# Patient Record
Sex: FEMALE | Race: WHITE | NOT HISPANIC OR LATINO | URBAN - METROPOLITAN AREA
[De-identification: names, ages, dates, MRNs, and addresses within clinical notes are randomized per-mention and may not be internally consistent; named-entity substitution may affect disease eponyms.]

---

## 2017-03-08 ENCOUNTER — HOSPITAL ENCOUNTER (INPATIENT)
Facility: MEDICAL CENTER | Age: 77
LOS: 9 days | DRG: 189 | End: 2017-03-17
Attending: EMERGENCY MEDICINE | Admitting: INTERNAL MEDICINE
Payer: MEDICARE

## 2017-03-08 ENCOUNTER — RESOLUTE PROFESSIONAL BILLING HOSPITAL PROF FEE (OUTPATIENT)
Dept: HOSPITALIST | Facility: MEDICAL CENTER | Age: 77
End: 2017-03-08
Payer: MEDICARE

## 2017-03-08 ENCOUNTER — APPOINTMENT (OUTPATIENT)
Dept: RADIOLOGY | Facility: MEDICAL CENTER | Age: 77
DRG: 189 | End: 2017-03-08
Attending: EMERGENCY MEDICINE
Payer: MEDICARE

## 2017-03-08 DIAGNOSIS — R09.02 HYPOXIA: ICD-10-CM

## 2017-03-08 DIAGNOSIS — J44.1 COPD EXACERBATION (HCC): ICD-10-CM

## 2017-03-08 DIAGNOSIS — J44.1 CHRONIC OBSTRUCTIVE PULMONARY DISEASE WITH ACUTE EXACERBATION (HCC): ICD-10-CM

## 2017-03-08 DIAGNOSIS — I50.9 CONGESTIVE HEART FAILURE, UNSPECIFIED CONGESTIVE HEART FAILURE CHRONICITY, UNSPECIFIED CONGESTIVE HEART FAILURE TYPE: ICD-10-CM

## 2017-03-08 LAB
ANION GAP SERPL CALC-SCNC: 5 MMOL/L (ref 0–11.9)
BASOPHILS # BLD AUTO: 0.5 % (ref 0–1.8)
BASOPHILS # BLD: 0.05 K/UL (ref 0–0.12)
BNP SERPL-MCNC: 227 PG/ML (ref 0–100)
BUN SERPL-MCNC: 23 MG/DL (ref 8–22)
CALCIUM SERPL-MCNC: 8.4 MG/DL (ref 8.5–10.5)
CHLORIDE SERPL-SCNC: 102 MMOL/L (ref 96–112)
CO2 SERPL-SCNC: 28 MMOL/L (ref 20–33)
CREAT SERPL-MCNC: 1.07 MG/DL (ref 0.5–1.4)
EOSINOPHIL # BLD AUTO: 0.09 K/UL (ref 0–0.51)
EOSINOPHIL NFR BLD: 1 % (ref 0–6.9)
ERYTHROCYTE [DISTWIDTH] IN BLOOD BY AUTOMATED COUNT: 55.8 FL (ref 35.9–50)
GFR SERPL CREATININE-BSD FRML MDRD: 50 ML/MIN/1.73 M 2
GLUCOSE SERPL-MCNC: 122 MG/DL (ref 65–99)
HCT VFR BLD AUTO: 50.3 % (ref 37–47)
HGB BLD-MCNC: 16.5 G/DL (ref 12–16)
IMM GRANULOCYTES # BLD AUTO: 0.04 K/UL (ref 0–0.11)
IMM GRANULOCYTES NFR BLD AUTO: 0.4 % (ref 0–0.9)
LYMPHOCYTES # BLD AUTO: 1.69 K/UL (ref 1–4.8)
LYMPHOCYTES NFR BLD: 18.1 % (ref 22–41)
MCH RBC QN AUTO: 31 PG (ref 27–33)
MCHC RBC AUTO-ENTMCNC: 32.8 G/DL (ref 33.6–35)
MCV RBC AUTO: 94.5 FL (ref 81.4–97.8)
MONOCYTES # BLD AUTO: 0.83 K/UL (ref 0–0.85)
MONOCYTES NFR BLD AUTO: 8.9 % (ref 0–13.4)
NEUTROPHILS # BLD AUTO: 6.63 K/UL (ref 2–7.15)
NEUTROPHILS NFR BLD: 71.1 % (ref 44–72)
NRBC # BLD AUTO: 0 K/UL
NRBC BLD AUTO-RTO: 0 /100 WBC
PLATELET # BLD AUTO: 276 K/UL (ref 164–446)
PMV BLD AUTO: 9.5 FL (ref 9–12.9)
POTASSIUM SERPL-SCNC: 4.6 MMOL/L (ref 3.6–5.5)
RBC # BLD AUTO: 5.32 M/UL (ref 4.2–5.4)
SODIUM SERPL-SCNC: 135 MMOL/L (ref 135–145)
TROPONIN I SERPL-MCNC: 0.05 NG/ML (ref 0–0.04)
WBC # BLD AUTO: 9.3 K/UL (ref 4.8–10.8)

## 2017-03-08 PROCEDURE — 700111 HCHG RX REV CODE 636 W/ 250 OVERRIDE (IP): Performed by: INTERNAL MEDICINE

## 2017-03-08 PROCEDURE — 36415 COLL VENOUS BLD VENIPUNCTURE: CPT

## 2017-03-08 PROCEDURE — 93005 ELECTROCARDIOGRAM TRACING: CPT | Performed by: EMERGENCY MEDICINE

## 2017-03-08 PROCEDURE — A9270 NON-COVERED ITEM OR SERVICE: HCPCS | Performed by: EMERGENCY MEDICINE

## 2017-03-08 PROCEDURE — 83880 ASSAY OF NATRIURETIC PEPTIDE: CPT

## 2017-03-08 PROCEDURE — 87040 BLOOD CULTURE FOR BACTERIA: CPT | Mod: 91

## 2017-03-08 PROCEDURE — 94640 AIRWAY INHALATION TREATMENT: CPT

## 2017-03-08 PROCEDURE — 304562 HCHG STAT O2 MASK/CANNULA

## 2017-03-08 PROCEDURE — 84484 ASSAY OF TROPONIN QUANT: CPT

## 2017-03-08 PROCEDURE — 99285 EMERGENCY DEPT VISIT HI MDM: CPT

## 2017-03-08 PROCEDURE — A9270 NON-COVERED ITEM OR SERVICE: HCPCS | Performed by: INTERNAL MEDICINE

## 2017-03-08 PROCEDURE — 80048 BASIC METABOLIC PNL TOTAL CA: CPT

## 2017-03-08 PROCEDURE — 700105 HCHG RX REV CODE 258: Performed by: INTERNAL MEDICINE

## 2017-03-08 PROCEDURE — 700101 HCHG RX REV CODE 250: Performed by: EMERGENCY MEDICINE

## 2017-03-08 PROCEDURE — 700111 HCHG RX REV CODE 636 W/ 250 OVERRIDE (IP): Performed by: EMERGENCY MEDICINE

## 2017-03-08 PROCEDURE — 99223 1ST HOSP IP/OBS HIGH 75: CPT | Performed by: INTERNAL MEDICINE

## 2017-03-08 PROCEDURE — 700102 HCHG RX REV CODE 250 W/ 637 OVERRIDE(OP): Performed by: EMERGENCY MEDICINE

## 2017-03-08 PROCEDURE — 71010 DX-CHEST-PORTABLE (1 VIEW): CPT

## 2017-03-08 PROCEDURE — 96365 THER/PROPH/DIAG IV INF INIT: CPT

## 2017-03-08 PROCEDURE — 770020 HCHG ROOM/CARE - TELE (206)

## 2017-03-08 PROCEDURE — 85025 COMPLETE CBC W/AUTO DIFF WBC: CPT

## 2017-03-08 PROCEDURE — 96367 TX/PROPH/DG ADDL SEQ IV INF: CPT

## 2017-03-08 PROCEDURE — 700102 HCHG RX REV CODE 250 W/ 637 OVERRIDE(OP): Performed by: INTERNAL MEDICINE

## 2017-03-08 RX ORDER — IRBESARTAN 150 MG/1
300 TABLET ORAL DAILY
Status: DISCONTINUED | OUTPATIENT
Start: 2017-03-09 | End: 2017-03-10

## 2017-03-08 RX ORDER — HYDROCHLOROTHIAZIDE 25 MG/1
25 TABLET ORAL DAILY
COMMUNITY

## 2017-03-08 RX ORDER — OMEPRAZOLE 20 MG/1
20 CAPSULE, DELAYED RELEASE ORAL 2 TIMES DAILY
Status: DISCONTINUED | OUTPATIENT
Start: 2017-03-08 | End: 2017-03-17 | Stop reason: HOSPADM

## 2017-03-08 RX ORDER — ALPRAZOLAM 0.5 MG/1
1 TABLET ORAL 3 TIMES DAILY PRN
Status: DISCONTINUED | OUTPATIENT
Start: 2017-03-08 | End: 2017-03-17 | Stop reason: HOSPADM

## 2017-03-08 RX ORDER — ONDANSETRON 2 MG/ML
4 INJECTION INTRAMUSCULAR; INTRAVENOUS EVERY 4 HOURS PRN
Status: DISCONTINUED | OUTPATIENT
Start: 2017-03-08 | End: 2017-03-17 | Stop reason: HOSPADM

## 2017-03-08 RX ORDER — DILTIAZEM HYDROCHLORIDE 180 MG/1
180 CAPSULE, COATED, EXTENDED RELEASE ORAL 2 TIMES DAILY
Status: DISCONTINUED | OUTPATIENT
Start: 2017-03-08 | End: 2017-03-17 | Stop reason: HOSPADM

## 2017-03-08 RX ORDER — TEMAZEPAM 15 MG/1
15 CAPSULE ORAL
Status: DISCONTINUED | OUTPATIENT
Start: 2017-03-08 | End: 2017-03-17 | Stop reason: HOSPADM

## 2017-03-08 RX ORDER — METHYLPREDNISOLONE SODIUM SUCCINATE 40 MG/ML
40 INJECTION, POWDER, LYOPHILIZED, FOR SOLUTION INTRAMUSCULAR; INTRAVENOUS EVERY 6 HOURS
Status: DISCONTINUED | OUTPATIENT
Start: 2017-03-09 | End: 2017-03-09

## 2017-03-08 RX ORDER — SERTRALINE HYDROCHLORIDE 100 MG/1
100 TABLET, FILM COATED ORAL
Status: DISCONTINUED | OUTPATIENT
Start: 2017-03-08 | End: 2017-03-17 | Stop reason: HOSPADM

## 2017-03-08 RX ORDER — LORAZEPAM 1 MG/1
0.5 TABLET ORAL EVERY 6 HOURS PRN
Status: DISCONTINUED | OUTPATIENT
Start: 2017-03-08 | End: 2017-03-17 | Stop reason: HOSPADM

## 2017-03-08 RX ORDER — POLYETHYLENE GLYCOL 3350 17 G/17G
1 POWDER, FOR SOLUTION ORAL
Status: DISCONTINUED | OUTPATIENT
Start: 2017-03-08 | End: 2017-03-17 | Stop reason: HOSPADM

## 2017-03-08 RX ORDER — BISACODYL 10 MG
10 SUPPOSITORY, RECTAL RECTAL
Status: DISCONTINUED | OUTPATIENT
Start: 2017-03-08 | End: 2017-03-17 | Stop reason: HOSPADM

## 2017-03-08 RX ORDER — ACETAMINOPHEN 325 MG/1
650 TABLET ORAL EVERY 6 HOURS PRN
Status: DISCONTINUED | OUTPATIENT
Start: 2017-03-08 | End: 2017-03-17 | Stop reason: HOSPADM

## 2017-03-08 RX ORDER — ONDANSETRON 4 MG/1
4 TABLET, ORALLY DISINTEGRATING ORAL EVERY 4 HOURS PRN
Status: DISCONTINUED | OUTPATIENT
Start: 2017-03-08 | End: 2017-03-17 | Stop reason: HOSPADM

## 2017-03-08 RX ORDER — HYDRALAZINE HYDROCHLORIDE 50 MG/1
50 TABLET, FILM COATED ORAL 2 TIMES DAILY
Status: DISCONTINUED | OUTPATIENT
Start: 2017-03-08 | End: 2017-03-17 | Stop reason: HOSPADM

## 2017-03-08 RX ORDER — IPRATROPIUM BROMIDE AND ALBUTEROL SULFATE 2.5; .5 MG/3ML; MG/3ML
3 SOLUTION RESPIRATORY (INHALATION) 4 TIMES DAILY
COMMUNITY

## 2017-03-08 RX ORDER — AMOXICILLIN 250 MG
2 CAPSULE ORAL 2 TIMES DAILY
Status: DISCONTINUED | OUTPATIENT
Start: 2017-03-08 | End: 2017-03-17 | Stop reason: HOSPADM

## 2017-03-08 RX ORDER — SIMVASTATIN 40 MG
40 TABLET ORAL NIGHTLY
Status: ON HOLD | COMMUNITY
End: 2017-03-17

## 2017-03-08 RX ORDER — HYDROCODONE BITARTRATE AND ACETAMINOPHEN 10; 325 MG/1; MG/1
1 TABLET ORAL EVERY 6 HOURS PRN
COMMUNITY

## 2017-03-08 RX ORDER — AZITHROMYCIN 500 MG/1
500 INJECTION, POWDER, LYOPHILIZED, FOR SOLUTION INTRAVENOUS ONCE
Status: COMPLETED | OUTPATIENT
Start: 2017-03-08 | End: 2017-03-08

## 2017-03-08 RX ORDER — HYDROCODONE BITARTRATE AND ACETAMINOPHEN 10; 325 MG/1; MG/1
1 TABLET ORAL EVERY 6 HOURS PRN
Status: DISCONTINUED | OUTPATIENT
Start: 2017-03-08 | End: 2017-03-17 | Stop reason: HOSPADM

## 2017-03-08 RX ORDER — NIACIN 500 MG
500 TABLET ORAL 2 TIMES DAILY
COMMUNITY

## 2017-03-08 RX ORDER — SODIUM CHLORIDE 9 MG/ML
INJECTION, SOLUTION INTRAVENOUS CONTINUOUS
Status: DISCONTINUED | OUTPATIENT
Start: 2017-03-08 | End: 2017-03-09

## 2017-03-08 RX ORDER — TIOTROPIUM BROMIDE 18 UG/1
1 CAPSULE ORAL; RESPIRATORY (INHALATION) DAILY
Status: DISCONTINUED | OUTPATIENT
Start: 2017-03-09 | End: 2017-03-09

## 2017-03-08 RX ORDER — LORAZEPAM 2 MG/ML
0.5 INJECTION INTRAMUSCULAR EVERY 6 HOURS PRN
Status: DISCONTINUED | OUTPATIENT
Start: 2017-03-08 | End: 2017-03-17 | Stop reason: HOSPADM

## 2017-03-08 RX ORDER — TRAZODONE HYDROCHLORIDE 50 MG/1
50 TABLET ORAL NIGHTLY
Status: DISCONTINUED | OUTPATIENT
Start: 2017-03-08 | End: 2017-03-17 | Stop reason: HOSPADM

## 2017-03-08 RX ORDER — METHYLPREDNISOLONE SODIUM SUCCINATE 125 MG/2ML
125 INJECTION, POWDER, LYOPHILIZED, FOR SOLUTION INTRAMUSCULAR; INTRAVENOUS ONCE
Status: COMPLETED | OUTPATIENT
Start: 2017-03-08 | End: 2017-03-08

## 2017-03-08 RX ORDER — HEPARIN SODIUM 5000 [USP'U]/ML
5000 INJECTION, SOLUTION INTRAVENOUS; SUBCUTANEOUS EVERY 8 HOURS
Status: DISCONTINUED | OUTPATIENT
Start: 2017-03-09 | End: 2017-03-17 | Stop reason: HOSPADM

## 2017-03-08 RX ORDER — SIMVASTATIN 40 MG
40 TABLET ORAL NIGHTLY
Status: DISCONTINUED | OUTPATIENT
Start: 2017-03-08 | End: 2017-03-15

## 2017-03-08 RX ORDER — CEFTRIAXONE 2 G/1
2 INJECTION, POWDER, FOR SOLUTION INTRAMUSCULAR; INTRAVENOUS ONCE
Status: COMPLETED | OUTPATIENT
Start: 2017-03-08 | End: 2017-03-08

## 2017-03-08 RX ORDER — LEVOTHYROXINE SODIUM 0.05 MG/1
50 TABLET ORAL
Status: DISCONTINUED | OUTPATIENT
Start: 2017-03-09 | End: 2017-03-17 | Stop reason: HOSPADM

## 2017-03-08 RX ORDER — TEMAZEPAM 15 MG/1
15-30 CAPSULE ORAL NIGHTLY PRN
COMMUNITY

## 2017-03-08 RX ORDER — OMEPRAZOLE 20 MG/1
20 CAPSULE, DELAYED RELEASE ORAL 2 TIMES DAILY
COMMUNITY

## 2017-03-08 RX ORDER — ALPRAZOLAM 0.25 MG/1
1 TABLET ORAL ONCE
Status: COMPLETED | OUTPATIENT
Start: 2017-03-08 | End: 2017-03-08

## 2017-03-08 RX ADMIN — AZITHROMYCIN 500 MG: 500 INJECTION, POWDER, LYOPHILIZED, FOR SOLUTION INTRAVENOUS at 21:00

## 2017-03-08 RX ADMIN — METHYLPREDNISOLONE SODIUM SUCCINATE 125 MG: 125 INJECTION, POWDER, FOR SOLUTION INTRAMUSCULAR; INTRAVENOUS at 18:08

## 2017-03-08 RX ADMIN — CEFTRIAXONE SODIUM 2 G: 2 INJECTION, POWDER, FOR SOLUTION INTRAMUSCULAR; INTRAVENOUS at 19:57

## 2017-03-08 RX ADMIN — IPRATROPIUM BROMIDE 0.5 MG: 0.5 SOLUTION RESPIRATORY (INHALATION) at 18:09

## 2017-03-08 RX ADMIN — ALBUTEROL SULFATE 10 MG: 5 SOLUTION RESPIRATORY (INHALATION) at 18:09

## 2017-03-08 RX ADMIN — SODIUM CHLORIDE: 9 INJECTION, SOLUTION INTRAVENOUS at 22:54

## 2017-03-08 RX ADMIN — HYDRALAZINE HYDROCHLORIDE 50 MG: 50 TABLET ORAL at 22:53

## 2017-03-08 RX ADMIN — TRAZODONE HYDROCHLORIDE 50 MG: 50 TABLET ORAL at 22:53

## 2017-03-08 RX ADMIN — SERTRALINE 100 MG: 100 TABLET, FILM COATED ORAL at 22:52

## 2017-03-08 RX ADMIN — OMEPRAZOLE 20 MG: 20 CAPSULE, DELAYED RELEASE ORAL at 22:53

## 2017-03-08 RX ADMIN — METHYLPREDNISOLONE SODIUM SUCCINATE 40 MG: 40 INJECTION, POWDER, FOR SOLUTION INTRAMUSCULAR; INTRAVENOUS at 22:53

## 2017-03-08 RX ADMIN — ALPRAZOLAM 1 MG: 0.25 TABLET ORAL at 19:36

## 2017-03-08 RX ADMIN — SIMVASTATIN 40 MG: 40 TABLET, FILM COATED ORAL at 22:53

## 2017-03-08 RX ADMIN — DILTIAZEM HYDROCHLORIDE 180 MG: 180 CAPSULE, COATED, EXTENDED RELEASE ORAL at 22:53

## 2017-03-08 ASSESSMENT — LIFESTYLE VARIABLES
EVER_SMOKED: YES
ALCOHOL_USE: NO

## 2017-03-08 ASSESSMENT — PATIENT HEALTH QUESTIONNAIRE - PHQ9
2. FEELING DOWN, DEPRESSED, IRRITABLE, OR HOPELESS: NOT AT ALL
1. LITTLE INTEREST OR PLEASURE IN DOING THINGS: NOT AT ALL
SUM OF ALL RESPONSES TO PHQ QUESTIONS 1-9: 0
SUM OF ALL RESPONSES TO PHQ9 QUESTIONS 1 AND 2: 0

## 2017-03-08 ASSESSMENT — PAIN SCALES - GENERAL
PAINLEVEL_OUTOF10: 0
PAINLEVEL_OUTOF10: 0

## 2017-03-08 NOTE — IP AVS SNAPSHOT
" <p align=\"LEFT\"><IMG SRC=\"//[email or web address]\" alt=\"Image\" WIDTH=\"50%\" HEIGHT=\"200\" BORDER=\"\"></p>                   Name:Kyaw Cuevas  Medical Record Number:[id number]  CSN: [id number]    YOB: 1940   Age: 76 y.o.  Sex: female  HT:1.575 m (5' 2\") WT: 73.1 kg (161 lb 2.5 oz)          Admit Date: 3/8/2017     Discharge Date:   Today's Date: 3/17/2017  Attending Doctor:  Armando Puga M.D.                  Allergies:  Review of patient's allergies indicates no known allergies.          Follow-up Information     1. Follow up with Denver J Miller, M.D.. Go on 4/4/2017.    Specialty:  Geriatrics    Why:  Please arrive at 11:30am for an 11:40am appointment. Thank you    Contact information    [address]  [facility] [address]  [facility] [address]  [phone]          2. Follow up with [facility] ([facility]) .    Specialty:  Home Health Services    Contact information    [address] [facility]  [address] [place] [id number] [phone]        3. Please follow up.    Why:  Follow up with Denver J Miller, M.D. In 1-2 weeks and defer Pulmonology referral, PFTs, sleep study. On Lasix, to be titrated and therefore BMP recommended on that visit         Medication List      Take these Medications        Instructions    albuterol 108 (90 BASE) MCG/ACT Aers inhalation aerosol    Inhale 1-2 Puffs by mouth every four hours as needed.   Dose:  1-2 Puff       alprazolam 0.5 MG Tabs   Commonly known as:  XANAX    Take 1 mg by mouth 3 times a day as needed.   Dose:  1 mg       amlodipine 5 MG Tabs   Commonly known as:  NORVASC    Take 1 Tab by mouth every day.   Dose:  5 mg       aspirin EC 81 MG Tbec   Commonly known as:  ECOTRIN    Take 81 mg by mouth every day.   Dose:  81 mg       AVAPRO 300 MG Tabs   Generic drug:  irbesartan    Take 300 mg by mouth every day.   Dose:  300 mg       budesonide-formoterol 160-4.5 MCG/ACT Aero   Commonly known as:  SYMBICORT    Inhale 2 Puffs by mouth 2 Times a Day.   Dose:  2 Puff  "       CARTIA  MG Cp24   Generic drug:  diltiazem CD    Take 180 mg by mouth 2 Times a Day.   Dose:  180 mg       furosemide 20 MG Tabs   Commonly known as:  LASIX    Take 1 Tab by mouth every day.   Dose:  20 mg       hydrALAZINE 50 MG Tabs   Commonly known as:  APRESOLINE    Take 50 mg by mouth 2 Times a Day.   Dose:  50 mg       hydrochlorothiazide 25 MG Tabs   Commonly known as:  HYDRODIURIL    Take 25 mg by mouth every day.   Dose:  25 mg       hydrocodone/acetaminophen  MG Tabs   Commonly known as:  NORCO    Take 1 Tab by mouth every 6 hours as needed.   Dose:  1 Tab       ipratropium-albuterol 0.5-2.5 (3) MG/3ML nebulizer solution   Commonly known as:  DUONEB    3 mL by Nebulization route 4 times a day.   Dose:  3 mL       levothyroxine 50 MCG Tabs   Commonly known as:  SYNTHROID    Take 50 mcg by mouth every day.   Dose:  50 mcg       montelukast 10 MG Tabs   Commonly known as:  SINGULAIR    Take 10 mg by mouth every evening.   Dose:  10 mg       niacin 500 MG Tabs    Take 500 mg by mouth 2 times a day.   Dose:  500 mg       omeprazole 20 MG delayed-release capsule   Commonly known as:  PRILOSEC    Take 20 mg by mouth 2 times a day.   Dose:  20 mg       predniSONE 20 MG Tabs   Commonly known as:  DELTASONE    Doctor's comments:  Pharmacy prescribe 50 tablets   40mg PO daily x 5 days then 30mg PO daily x 5 days then 40mg PO daily x 5 days then 20mg PO daily x 5 days then 10mg PO daily x 5 days then 5mg PO daily x 5 days then STOP       sertraline 100 MG Tabs   Commonly known as:  ZOLOFT    Take 100 mg by mouth every day.   Dose:  100 mg       simvastatin 10 MG Tabs   What changed:    - medication strength  - how much to take  - when to take this   Commonly known as:  ZOCOR    Take 1 Tab by mouth every evening.   Dose:  10 mg       temazepam 15 MG Caps   Commonly known as:  RESTORIL    Take 15-30 mg by mouth at bedtime as needed for Sleep.   Dose:  15-30 mg       tiotropium 18 MCG Caps   Commonly  known as:  SPIRIVA    Inhale 18 mcg by mouth every day.   Dose:  18 mcg       trazodone 50 MG Tabs   Commonly known as:  DESYREL    Take 50 mg by mouth every evening.   Dose:  50 mg

## 2017-03-08 NOTE — IP AVS SNAPSHOT
" Home Care Instructions                                                                                                                  Name:Kyaw Cuevas  Medical Record Number:[id number]  CSN: [id number]    YOB: 1940   Age: 76 y.o.  Sex: female  HT:1.575 m (5' 2\") WT: 73.1 kg (161 lb 2.5 oz)          Admit Date: 3/8/2017     Discharge Date:   Today's Date: 3/17/2017  Attending Doctor:  Armando Puga M.D.                  Allergies:  Review of patient's allergies indicates no known allergies.            Discharge Instructions       Discharge Instructions    Discharged to home by car with relative. Discharged via wheelchair, hospital escort: Yes.  Special equipment needed: Oxygen    Be sure to schedule a follow-up appointment with your primary care doctor or any specialists as instructed.     Discharge Plan:   Diet Plan: Discussed  Activity Level: Discussed  Confirmed Follow up Appointment: Patient to Call and Schedule Appointment  Confirmed Symptoms Management: Discussed  Medication Reconciliation Updated: Yes  Pneumococcal Vaccine Given - only chart on this line when given: Given (See MAR)  Influenza Vaccine Indication: Not indicated: Previously immunized this influenza season and > 8 years of age    I understand that a diet low in cholesterol, fat, and sodium is recommended for good health. Unless I have been given specific instructions below for another diet, I accept this instruction as my diet prescription.   Other diet: as tolerated    Special Instructions: None    · Is patient discharged on Warfarin / Coumadin?   No     · Is patient Post Blood Transfusion?  No    Depression / Suicide Risk    As you are discharged from this [facility], it is important to learn how to keep safe from harming yourself.    Recognize the warning signs:  · Abrupt changes in personality, positive or negative- including increase in energy   · Giving away possessions  · Change in eating patterns- significant " weight changes-  positive or negative  · Change in sleeping patterns- unable to sleep or sleeping all the time   · Unwillingness or inability to communicate  · Depression  · Unusual sadness, discouragement and loneliness  · Talk of wanting to die  · Neglect of personal appearance   · Rebelliousness- reckless behavior  · Withdrawal from people/activities they love  · Confusion- inability to concentrate     If you or a loved one observes any of these behaviors or has concerns about self-harm, here's what you can do:  · Talk about it- your feelings and reasons for harming yourself  · Remove any means that you might use to hurt yourself (examples: pills, rope, extension cords, firearm)  · Get professional help from the community (Mental Health, Substance Abuse, psychological counseling)  · Do not be alone:Call your Safe Contact- someone whom you trust who will be there for you.  · Call your local CRISIS HOTLINE 431-3153 or 028-340-3271  · Call your local Children's Mobile Crisis Response Team [place] (207) 081-6355 or www.Inveni  · Call the toll free National Suicide Prevention Hotlines   · National Suicide Prevention Lifeline 884-178-SWSW (3003)  · National Hope Line Network 800-SUICIDE (162-7086)        Chronic Obstructive Pulmonary Disease  Chronic obstructive pulmonary disease (COPD) is a common lung condition in which airflow from the lungs is limited. COPD is a general term that can be used to describe many different lung problems that limit airflow, including both chronic bronchitis and emphysema. If you have COPD, your lung function will probably never return to normal, but there are measures you can take to improve lung function and make yourself feel better.  CAUSES   · Smoking (common).  · Exposure to secondhand smoke.  · Genetic problems.  · Chronic inflammatory lung diseases or recurrent infections.  SYMPTOMS  2. Shortness of breath, especially with physical activity.  3. Deep, persistent  (chronic) cough with a large amount of thick mucus.  4. Wheezing.  5. Rapid breaths (tachypnea).  6. Gray or bluish discoloration (cyanosis) of the skin, especially in your fingers, toes, or lips.  7. Fatigue.  8. Weight loss.  9. Frequent infections or episodes when breathing symptoms become much worse (exacerbations).  10. Chest tightness.  DIAGNOSIS  Your health care provider will take a medical history and perform a physical examination to diagnose COPD. Additional tests for COPD may include:  2. Lung (pulmonary) function tests.  3. Chest X-ray.  4. CT scan.  5. Blood tests.  TREATMENT   Treatment for COPD may include:  2. Inhaler and nebulizer medicines. These help manage the symptoms of COPD and make your breathing more comfortable.  3. Supplemental oxygen. Supplemental oxygen is only helpful if you have a low oxygen level in your blood.  4. Exercise and physical activity. These are beneficial for nearly all people with COPD.  5. Lung surgery or transplant.  6. Nutrition therapy to gain weight, if you are underweight.  7. Pulmonary rehabilitation. This may involve working with a team of health care providers and specialists, such as respiratory, occupational, and physical therapists.  HOME CARE INSTRUCTIONS  2. Take all medicines (inhaled or pills) as directed by your health care provider.  3. Avoid over-the-counter medicines or cough syrups that dry up your airway (such as antihistamines) and slow down the elimination of secretions unless instructed otherwise by your health care provider.  4. If you are a smoker, the most important thing that you can do is stop smoking. Continuing to smoke will cause further lung damage and breathing trouble. Ask your health care provider for help with quitting smoking. He or she can direct you to community resources or hospitals that provide support.  5. Avoid exposure to irritants such as smoke, chemicals, and fumes that aggravate your breathing.  6. Use oxygen therapy and  pulmonary rehabilitation if directed by your health care provider. If you require home oxygen therapy, ask your health care provider whether you should purchase a pulse oximeter to measure your oxygen level at home.  7. Avoid contact with individuals who have a contagious illness.  8. Avoid extreme temperature and humidity changes.  9. Eat healthy foods. Eating smaller, more frequent meals and resting before meals may help you maintain your strength.  10. Stay active, but balance activity with periods of rest. Exercise and physical activity will help you maintain your ability to do things you want to do.  11. Preventing infection and hospitalization is very important when you have COPD. Make sure to receive all the vaccines your health care provider recommends, especially the pneumococcal and influenza vaccines. Ask your health care provider whether you need a pneumonia vaccine.  12. Learn and use relaxation techniques to manage stress.  13. Learn and use controlled breathing techniques as directed by your health care provider. Controlled breathing techniques include:  1. Pursed lip breathing. Start by breathing in (inhaling) through your nose for 1 second. Then, purse your lips as if you were going to whistle and breathe out (exhale) through the pursed lips for 2 seconds.  2. Diaphragmatic breathing. Start by putting one hand on your abdomen just above your waist. Inhale slowly through your nose. The hand on your abdomen should move out. Then purse your lips and exhale slowly. You should be able to feel the hand on your abdomen moving in as you exhale.  14. Learn and use controlled coughing to clear mucus from your lungs. Controlled coughing is a series of short, progressive coughs. The steps of controlled coughing are:  1. Lean your head slightly forward.  2. Breathe in deeply using diaphragmatic breathing.  3. Try to hold your breath for 3 seconds.  4. Keep your mouth slightly open while coughing twice.  5. Spit  any mucus out into a tissue.  6. Rest and repeat the steps once or twice as needed.  SEEK MEDICAL CARE IF:  · You are coughing up more mucus than usual.  · There is a change in the color or thickness of your mucus.  · Your breathing is more labored than usual.  · Your breathing is faster than usual.  SEEK IMMEDIATE MEDICAL CARE IF:  · You have shortness of breath while you are resting.  · You have shortness of breath that prevents you from:  ¨ Being able to talk.  ¨ Performing your usual physical activities.  · You have chest pain lasting longer than 5 minutes.  · Your skin color is more cyanotic than usual.  · You measure low oxygen saturations for longer than 5 minutes with a pulse oximeter.  MAKE SURE YOU:  · Understand these instructions.  · Will watch your condition.  · Will get help right away if you are not doing well or get worse.     This information is not intended to replace advice given to you by your health care provider. Make sure you discuss any questions you have with your health care provider.     Document Released: 09/27/2006 Document Revised: 01/08/2016 Document Reviewed: 08/14/2014  [employer] Interactive Patient Education ©2016 [employer].    Amlodipine tablets  What is this medicine?  AMLODIPINE (am ALEXEI di peen) is a calcium-channel blocker. It affects the amount of calcium found in your heart and muscle cells. This relaxes your blood vessels, which can reduce the amount of work the heart has to do. This medicine is used to lower high blood pressure. It is also used to prevent chest pain.  This medicine may be used for other purposes; ask your health care provider or pharmacist if you have questions.  COMMON BRAND NAME(S): Norvasc  What should I tell my health care provider before I take this medicine?  They need to know if you have any of these conditions:  -heart problems like heart failure or aortic stenosis  -liver disease  -an unusual or allergic reaction to amlodipine, other medicines,  foods, dyes, or preservatives  -pregnant or trying to get pregnant  -breast-feeding  How should I use this medicine?  Take this medicine by mouth with a glass of water. Follow the directions on the prescription label. Take your medicine at regular intervals. Do not take more medicine than directed.  Talk to your pediatrician regarding the use of this medicine in children. Special care may be needed. This medicine has been used in children as young as 6.  Persons over 65 years old may have a stronger reaction to this medicine and need smaller doses.  Overdosage: If you think you have taken too much of this medicine contact a poison control center or emergency room at once.  NOTE: This medicine is only for you. Do not share this medicine with others.  What if I miss a dose?  If you miss a dose, take it as soon as you can. If it is almost time for your next dose, take only that dose. Do not take double or extra doses.  What may interact with this medicine?  -herbal or dietary supplements  -local or general anesthetics  -medicines for high blood pressure  -medicines for prostate problems  -rifampin  This list may not describe all possible interactions. Give your health care provider a list of all the medicines, herbs, non-prescription drugs, or dietary supplements you use. Also tell them if you smoke, drink alcohol, or use illegal drugs. Some items may interact with your medicine.  What should I watch for while using this medicine?  Visit your doctor or health care professional for regular check ups. Check your blood pressure and pulse rate regularly. Ask your health care professional what your blood pressure and pulse rate should be, and when you should contact him or her.  This medicine may make you feel confused, dizzy or lightheaded. Do not drive, use machinery, or do anything that needs mental alertness until you know how this medicine affects you. To reduce the risk of dizzy or fainting spells, do not sit or stand  up quickly, especially if you are an older patient. Avoid alcoholic drinks; they can make you more dizzy.  Do not suddenly stop taking amlodipine. Ask your doctor or health care professional how you can gradually reduce the dose.  What side effects may I notice from receiving this medicine?  Side effects that you should report to your doctor or health care professional as soon as possible:  -allergic reactions like skin rash, itching or hives, swelling of the face, lips, or tongue  -breathing problems  -changes in vision or hearing  -chest pain  -fast, irregular heartbeat  -swelling of legs or ankles  Side effects that usually do not require medical attention (report to your doctor or health care professional if they continue or are bothersome):  -dry mouth  -facial flushing  -nausea, vomiting  -stomach gas, pain  -tired, weak  -trouble sleeping  This list may not describe all possible side effects. Call your doctor for medical advice about side effects. You may report side effects to FDA at 0-358-FDA-1437.  Where should I keep my medicine?  Keep out of the reach of children.  Store at room temperature between 59 and 86 degrees F (15 and 30 degrees C). Protect from light. Keep container tightly closed. Throw away any unused medicine after the expiration date.  NOTE: This sheet is a summary. It may not cover all possible information. If you have questions about this medicine, talk to your doctor, pharmacist, or health care provider.  © 2014, [employer]. (11/15/2013 11:40:58 AM)    Furosemide tablets  What is this medicine?  FUROSEMIDE (fyoor [place] se mide) is a diuretic. It helps you make more urine and to lose salt and excess water from your body. This medicine is used to treat high blood pressure, and edema or swelling from heart, kidney, or liver disease.  This medicine may be used for other purposes; ask your health care provider or pharmacist if you have questions.  COMMON BRAND NAME(S): Amrik  Lasix  What should I tell my health care provider before I take this medicine?  They need to know if you have any of these conditions:  -abnormal blood electrolytes  -diarrhea or vomiting  -gout  -heart disease  -kidney disease, small amounts of urine, or difficulty passing urine  -liver disease  -an unusual or allergic reaction to furosemide, sulfa drugs, other medicines, foods, dyes, or preservatives  -pregnant or trying to get pregnant  -breast-feeding  How should I use this medicine?  Take this medicine by mouth with a glass of water. Follow the directions on the prescription label. You may take this medicine with or without food. If it upsets your stomach, take it with food or milk. Do not take your medicine more often than directed. Remember that you will need to pass more urine after taking this medicine. Do not take your medicine at a time of day that will cause you problems. Do not take at bedtime.  Talk to your pediatrician regarding the use of this medicine in children. While this drug may be prescribed for selected conditions, precautions do apply.  Overdosage: If you think you have taken too much of this medicine contact a poison control center or emergency room at once.  NOTE: This medicine is only for you. Do not share this medicine with others.  What if I miss a dose?  If you miss a dose, take it as soon as you can. If it is almost time for your next dose, take only that dose. Do not take double or extra doses.  What may interact with this medicine?  -aspirin and aspirin-like medicines  -certain antibiotics  -chloral hydrate  -cisplatin  -cyclosporine  -digoxin  -diuretics  -laxatives  -lithium  -medicines for blood pressure  -medicines that relax muscles for surgery  -methotrexate  -NSAIDs, medicines for pain and inflammation like ibuprofen, naproxen, or indomethacin  -phenytoin  -steroid medicines like prednisone or cortisone  -sucralfate  This list may not describe all possible interactions. Give  your health care provider a list of all the medicines, herbs, non-prescription drugs, or dietary supplements you use. Also tell them if you smoke, drink alcohol, or use illegal drugs. Some items may interact with your medicine.  What should I watch for while using this medicine?  Visit your doctor or health care professional for regular checks on your progress. Check your blood pressure regularly. Ask your doctor or health care professional what your blood pressure should be, and when you should contact him or her. If you are a diabetic, check your blood sugar as directed.  You may need to be on a special diet while taking this medicine. Check with your doctor. Also, ask how many glasses of fluid you need to drink a day. You must not get dehydrated.  You may get drowsy or dizzy. Do not drive, use machinery, or do anything that needs mental alertness until you know how this drug affects you. Do not stand or sit up quickly, especially if you are an older patient. This reduces the risk of dizzy or fainting spells. Alcohol can make you more drowsy and dizzy. Avoid alcoholic drinks.  This medicine can make you more sensitive to the sun. Keep out of the sun. If you cannot avoid being in the sun, wear protective clothing and use sunscreen. Do not use sun lamps or tanning beds/booths.  What side effects may I notice from receiving this medicine?  Side effects that you should report to your doctor or health care professional as soon as possible:  -blood in urine or stools  -dry mouth  -fever or chills  -hearing loss or ringing in the ears  -irregular heartbeat  -muscle pain or weakness, cramps  -skin rash  -stomach upset, pain, or nausea  -tingling or numbness in the hands or feet  -unusually weak or tired  -vomiting or diarrhea  -yellowing of the eyes or skin  Side effects that usually do not require medical attention (report to your doctor or health care professional if they continue or are bothersome):  -headache  -loss  of appetite  -unusual bleeding or bruising  This list may not describe all possible side effects. Call your doctor for medical advice about side effects. You may report side effects to FDA at 5-782-FDA-6613.  Where should I keep my medicine?  Keep out of the reach of children.  Store at room temperature between 15 and 30 degrees C (59 and 86 degrees F). Protect from light. Throw away any unused medicine after the expiration date.  NOTE: This sheet is a summary. It may not cover all possible information. If you have questions about this medicine, talk to your doctor, pharmacist, or health care provider.  © 2014, [employer]. (12/6/2010 4:24:50 PM)    Prednisone tablets  What is this medicine?  PREDNISONE (PRED ni sone) is a corticosteroid. It is commonly used to treat inflammation of the skin, joints, lungs, and other organs. Common conditions treated include asthma, allergies, and arthritis. It is also used for other conditions, such as blood disorders and diseases of the adrenal glands.  This medicine may be used for other purposes; ask your health care provider or pharmacist if you have questions.  COMMON BRAND NAME(S): Deltasone, Predone, Sterapred DS, Sterapred  What should I tell my health care provider before I take this medicine?  They need to know if you have any of these conditions:  -Cushing's syndrome  -diabetes  -glaucoma  -heart disease  -high blood pressure  -infection (especially a virus infection such as chickenpox, cold sores, or herpes)  -kidney disease  -liver disease  -mental illness  -myasthenia gravis  -osteoporosis  -seizures  -stomach or intestine problems  -thyroid disease  -an unusual or allergic reaction to lactose, prednisone, other medicines, foods, dyes, or preservatives  -pregnant or trying to get pregnant  -breast-feeding  How should I use this medicine?  Take this medicine by mouth with a glass of water. Follow the directions on the prescription label. Take this medicine  with food. If you are taking this medicine once a day, take it in the morning. Do not take more medicine than you are told to take. Do not suddenly stop taking your medicine because you may develop a severe reaction. Your doctor will tell you how much medicine to take. If your doctor wants you to stop the medicine, the dose may be slowly lowered over time to avoid any side effects.  Talk to your pediatrician regarding the use of this medicine in children. Special care may be needed.  Overdosage: If you think you have taken too much of this medicine contact a poison control center or emergency room at once.  NOTE: This medicine is only for you. Do not share this medicine with others.  What if I miss a dose?  If you miss a dose, take it as soon as you can. If it is almost time for your next dose, talk to your doctor or health care professional. You may need to miss a dose or take an extra dose. Do not take double or extra doses without advice.  What may interact with this medicine?  Do not take this medicine with any of the following medications:  -metyrapone  -mifepristone  This medicine may also interact with the following medications:  -aminoglutethimide  -amphotericin B  -aspirin and aspirin-like medicines  -barbiturates  -certain medicines for diabetes, like glipizide or glyburide  -cholestyramine  -cholinesterase inhibitors  -cyclosporine  -digoxin  -diuretics  -ephedrine  -female hormones, like estrogens and birth control pills  -isoniazid  -ketoconazole  -NSAIDS, medicines for pain and inflammation, like ibuprofen or naproxen  -phenytoin  -rifampin  -toxoids  -vaccines  -warfarin  This list may not describe all possible interactions. Give your health care provider a list of all the medicines, herbs, non-prescription drugs, or dietary supplements you use. Also tell them if you smoke, drink alcohol, or use illegal drugs. Some items may interact with your medicine.  What should I watch for while using this  medicine?  Visit your doctor or health care professional for regular checks on your progress. If you are taking this medicine over a prolonged period, carry an identification card with your name and address, the type and dose of your medicine, and your doctor's name and address.  This medicine may increase your risk of getting an infection. Tell your doctor or health care professional if you are around anyone with measles or chickenpox, or if you develop sores or blisters that do not heal properly.  If you are going to have surgery, tell your doctor or health care professional that you have taken this medicine within the last twelve months.  Ask your doctor or health care professional about your diet. You may need to lower the amount of salt you eat.  This medicine may affect blood sugar levels. If you have diabetes, check with your doctor or health care professional before you change your diet or the dose of your diabetic medicine.  What side effects may I notice from receiving this medicine?  Side effects that you should report to your doctor or health care professional as soon as possible:  -allergic reactions like skin rash, itching or hives, swelling of the face, lips, or tongue  -changes in emotions or moods  -changes in vision  -depressed mood  -eye pain  -fever or chills, cough, sore throat, pain or difficulty passing urine  -increased thirst  -swelling of ankles, feet  Side effects that usually do not require medical attention (report to your doctor or health care professional if they continue or are bothersome):  -confusion, excitement, restlessness  -headache  -nausea, vomiting  -skin problems, acne, thin and shiny skin  -trouble sleeping  -weight gain  This list may not describe all possible side effects. Call your doctor for medical advice about side effects. You may report side effects to FDA at 0-305-FDA-5084.  Where should I keep my medicine?  Keep out of the reach of children.  Store at room  temperature between 15 and 30 degrees C (59 and 86 degrees F). Protect from light. Keep container tightly closed. Throw away any unused medicine after the expiration date.  NOTE: This sheet is a summary. It may not cover all possible information. If you have questions about this medicine, talk to your doctor, pharmacist, or health care provider.  © 2014, [employer] Standard. (8/2/2012 10:57:14 AM)        Follow-up Information     1. Follow up with Denver J Miller, M.D.. Go on 4/4/2017.    Specialty:  Geriatrics    Why:  Please arrive at 11:30am for an 11:40am appointment. Thank you    Contact information    [address]  [address]  [place] [address]  [phone]          2. Follow up with [facility] ([facility]) .    Specialty:  Home Health Services    Contact information    [address]  [address]  [facility] [id number] [phone]        3. Please follow up.    Why:  Follow up with Denver J Miller, M.D. In 1-2 weeks and defer Pulmonology referral, PFTs, sleep study. On Lasix, to be titrated and therefore BMP recommended on that visit         Discharge Medication Instructions:    Below are the medications your physician expects you to take upon discharge:    Review all your home medications and newly ordered medications with your doctor and/or pharmacist. Follow medication instructions as directed by your doctor and/or pharmacist.    Please keep your medication list with you and share with your physician.               Medication List      START taking these medications        Instructions    amlodipine 5 MG Tabs   Last time this was given:  5 mg on 3/17/2017  8:23 AM   Commonly known as:  NORVASC    Take 1 Tab by mouth every day.   Dose:  5 mg       budesonide-formoterol 160-4.5 MCG/ACT Aero   Last time this was given:  2 Puffs on 3/17/2017  8:23 AM   Commonly known as:  SYMBICORT    Inhale 2 Puffs by mouth 2 Times a Day.   Dose:  2 Puff       furosemide 20 MG Tabs   Last time this was given:  20 mg  on 3/17/2017  8:23 AM   Commonly known as:  LASIX    Take 1 Tab by mouth every day.   Dose:  20 mg       predniSONE 20 MG Tabs   Last time this was given:  40 mg on 3/17/2017  8:23 AM   Commonly known as:  RICHARD    Doctor's comments:  Pharmacy prescribe 50 tablets   40mg PO daily x 5 days then 30mg PO daily x 5 days then 40mg PO daily x 5 days then 20mg PO daily x 5 days then 10mg PO daily x 5 days then 5mg PO daily x 5 days then STOP         CHANGE how you take these medications        Instructions    simvastatin 10 MG Tabs   What changed:    - medication strength  - how much to take  - when to take this   Last time this was given:  10 mg on 3/16/2017  9:22 PM   Commonly known as:  ZOCOR    Take 1 Tab by mouth every evening.   Dose:  10 mg         CONTINUE taking these medications        Instructions    albuterol 108 (90 BASE) MCG/ACT Aers inhalation aerosol    Inhale 1-2 Puffs by mouth every four hours as needed.   Dose:  1-2 Puff       alprazolam 0.5 MG Tabs   Last time this was given:  1 mg on 3/16/2017  9:23 PM   Commonly known as:  XANAX    Take 1 mg by mouth 3 times a day as needed.   Dose:  1 mg       aspirin EC 81 MG Tbec   Last time this was given:  81 mg on 3/16/2017  8:39 AM   Commonly known as:  ECOTRIN    Take 81 mg by mouth every day.   Dose:  81 mg       AVAPRO 300 MG Tabs   Generic drug:  irbesartan    Take 300 mg by mouth every day.   Dose:  300 mg       CARTIA  MG Cp24   Last time this was given:  180 mg on 3/17/2017  8:23 AM   Generic drug:  diltiazem CD    Take 180 mg by mouth 2 Times a Day.   Dose:  180 mg       hydrALAZINE 50 MG Tabs   Last time this was given:  50 mg on 3/17/2017  8:23 AM   Commonly known as:  APRESOLINE    Take 50 mg by mouth 2 Times a Day.   Dose:  50 mg       hydrochlorothiazide 25 MG Tabs   Commonly known as:  HYDRODIURIL    Take 25 mg by mouth every day.   Dose:  25 mg       hydrocodone/acetaminophen  MG Tabs   Last time this was given:  1 Tab on  3/13/2017  5:19 PM   Commonly known as:  NORCO    Take 1 Tab by mouth every 6 hours as needed.   Dose:  1 Tab       ipratropium-albuterol 0.5-2.5 (3) MG/3ML nebulizer solution   Last time this was given:  3 mL on 3/11/2017  6:59 AM   Commonly known as:  DUONEB    3 mL by Nebulization route 4 times a day.   Dose:  3 mL       levothyroxine 50 MCG Tabs   Last time this was given:  50 mcg on 3/17/2017  6:29 AM   Commonly known as:  SYNTHROID    Take 50 mcg by mouth every day.   Dose:  50 mcg       montelukast 10 MG Tabs   Commonly known as:  SINGULAIR    Take 10 mg by mouth every evening.   Dose:  10 mg       niacin 500 MG Tabs    Take 500 mg by mouth 2 times a day.   Dose:  500 mg       omeprazole 20 MG delayed-release capsule   Last time this was given:  20 mg on 3/17/2017  8:23 AM   Commonly known as:  PRILOSEC    Take 20 mg by mouth 2 times a day.   Dose:  20 mg       sertraline 100 MG Tabs   Last time this was given:  100 mg on 3/16/2017  9:22 PM   Commonly known as:  ZOLOFT    Take 100 mg by mouth every day.   Dose:  100 mg       temazepam 15 MG Caps   Commonly known as:  RESTORIL    Take 15-30 mg by mouth at bedtime as needed for Sleep.   Dose:  15-30 mg       tiotropium 18 MCG Caps   Last time this was given:  1 Cap on 3/17/2017  8:23 AM   Commonly known as:  SPIRIVA    Inhale 18 mcg by mouth every day.   Dose:  18 mcg       trazodone 50 MG Tabs   Last time this was given:  50 mg on 3/16/2017  9:22 PM   Commonly known as:  DESYREL    Take 50 mg by mouth every evening.   Dose:  50 mg         STOP taking these medications     ibuprofen 200 MG Tabs   Commonly known as:  MOTRIN               Instructions           Diet / Nutrition:    Follow any diet instructions given to you by your doctor or the dietician, including how much salt (sodium) you are allowed each day.    If you are overweight, talk to your doctor about a weight reduction plan.    Activity:    Remain physically active following your doctor's  instructions about exercise and activity.    Rest often.     Any time you become even a little tired or short of breath, SIT DOWN and rest.    Worsening Symptoms:    Report any of the following signs and symptoms to the doctor's office immediately:    *Pain of jaw, arm, or neck  *Chest pain not relieved by medication                               *Dizziness or loss of consciousness  *Difficulty breathing even when at rest   *More tired than usual                                       *Bleeding drainage or swelling of surgical site  *Swelling of feet, ankles, legs or stomach                 *Fever (>100ºF)  *Pink or blood tinged sputum  *Weight gain (3lbs/day or 5lbs /week)           *Shock from internal defibrillator (if applicable)  *Palpitations or irregular heartbeats                *Cool and/or numb extremities    Stroke Awareness    Common Risk Factors for Stroke include:    Age  Atrial Fibrillation  Carotid Artery Stenosis  Diabetes Mellitus  Excessive alcohol consumption  High blood pressure  Overweight   Physical inactivity  Smoking    Warning signs and symptoms of a stroke include:    *Sudden numbness or weakness of the face, arm or leg (especially on one side of the body).  *Sudden confusion, trouble speaking or understanding.  *Sudden trouble seeing in one or both eyes.  *Sudden trouble walking, dizziness, loss of balance or coordination.Sudden severe headache with no known cause.    It is very important to get treatment quickly when a stroke occurs. If you experience any of the above warning signs, call 911 immediately.                   Disclaimer         Quit Smoking / Tobacco Use:    I understand the use of any tobacco products increases my chance of suffering from future heart disease or stroke and could cause other illnesses which may shorten my life. Quitting the use of tobacco products is the single most important thing I can do to improve my health. For further information on smoking / tobacco  cessation call a Toll Free Quit Line at 1-475.212.5525 (*[employer] [place]) or 1-883.411.1896 (American Lung Association) or you can access the web based program at www.lungusa.org.    [place] Tobacco Users Help Line:  (528) 723-3981       Toll Free: 1-671.620.1727  Quit Tobacco Program [facility] (153)885-7891    Crisis Hotline:    [place] Crisis Hotline:  4-664-JBCYZIE or 1-961.477.9718    [place] Crisis Hotline:    1-370.627.4554 or 036-236-4622    Discharge Survey:   Thank you for choosing [facility]. We hope we did everything we could to make your hospital stay a pleasant one. You may be receiving a phone survey and we would appreciate your time and participation in answering the questions. Your input is very valuable to us in our efforts to improve our service to our patients and their families.        My signature on this form indicates that:    1. I have reviewed and understand the above information.  2. My questions regarding this information have been answered to my satisfaction.  3. I have formulated a plan with my discharge nurse to obtain my prescribed medications for home.                  Disclaimer         __________________________________                     __________       ________                       Patient Signature                                                 Date                    Time

## 2017-03-08 NOTE — IP AVS SNAPSHOT
Advanced LEDs Access Code: [email or web address]  Expires: 4/16/2017  4:25 PM    Your email address is not on file at [facility].  Email Addresses are required for you to sign up for Advanced LEDs, please contact [phone] to verify your personal information and to provide your email address prior to attempting to register for Advanced LEDs.    Kyaw Troarein  [address] [place] [address]    Advanced LEDs  A secure, online tool to manage your health information     [facility]’s Advanced LEDs® is a secure, online tool that connects you to your personalized health information from the privacy of your home -- day or night - making it very easy for you to manage your healthcare. Once the activation process is completed, you can even access your medical information using the Advanced LEDs jairo, which is available for free in the Apple Jairo store or Google Play store.     To learn more about Advanced LEDs, visit [email or web address]    There are two levels of access available (as shown below):   My Chart Features  [facility] Primary Care Doctor [facility]  Specialists [facility]  Urgent  Care Non-[facility] Primary Care Doctor   Email your healthcare team securely and privately 24/7 X X X    Manage appointments: schedule your next appointment; view details of past/upcoming appointments X      Request prescription refills. X      View recent personal medical records, including lab and immunizations X X X X   View health record, including health history, allergies, medications X X X X   Read reports about your outpatient visits, procedures, consult and ER notes X X X X   See your discharge summary, which is a recap of your hospital and/or ER visit that includes your diagnosis, lab results, and care plan X X  X     How to register for Advanced LEDs:  Once your e-mail address has been verified, follow the following steps to sign up for Advanced LEDs.     1. Go to  [email or web address]  2. Click on the Sign Up Now box, which takes you to the New Member Sign Up page. You will  need to provide the following information:  a. Enter your [employer] Access Code exactly as it appears at the top of this page. (You will not need to use this code after you’ve completed the sign-up process. If you do not sign up before the expiration date, you must request a new code.)   b. Enter your date of birth.   c. Enter your home email address.   d. Click Submit, and follow the next screen’s instructions.  3. Create a [employer] Groupt ID. This will be your ZEFR login ID and cannot be changed, so think of one that is secure and easy to remember.  4. Create a ZEFR password. You can change your password at any time.  5. Enter your Password Reset Question and Answer. This can be used at a later time if you forget your password.   6. Enter your e-mail address. This allows you to receive e-mail notifications when new information is available in ZEFR.  7. Click Sign Up. You can now view your health information.    For assistance activating your ZE[employer] account, call [phone]

## 2017-03-08 NOTE — IP AVS SNAPSHOT
3/17/2017          Kyaw Cuevas  [address] [place] [address]    Dear Kyaw:    [facility] wants to ensure your discharge home is safe and you or your loved ones have had all your questions answered regarding your care after you leave the hospital.    You may receive a telephone call within two days of your discharge.  This call is to make certain you understand your discharge instructions as well as ensure we provided you with the best care possible during your stay with us.     The call will only last approximately 3-5 minutes and will be done by a nurse.    Once again, we want to ensure your discharge home is safe and that you have a clear understanding of any next steps in your care.  If you have any questions or concerns, please do not hesitate to contact us, we are here for you.  Thank you for choosing [facility] for your healthcare needs.    Sincerely,    Domo Lopez    [facility]

## 2017-03-09 PROBLEM — R79.89 ELEVATED BRAIN NATRIURETIC PEPTIDE (BNP) LEVEL: Status: ACTIVE | Noted: 2017-03-09

## 2017-03-09 PROBLEM — R79.89 ELEVATED TROPONIN: Status: ACTIVE | Noted: 2017-03-09

## 2017-03-09 PROBLEM — J96.00 ACUTE RESPIRATORY FAILURE (HCC): Status: ACTIVE | Noted: 2017-03-09

## 2017-03-09 PROBLEM — F32.A DEPRESSION: Status: ACTIVE | Noted: 2017-03-09

## 2017-03-09 PROBLEM — E87.1 HYPONATREMIA: Status: ACTIVE | Noted: 2017-03-09

## 2017-03-09 PROBLEM — D75.1 POLYCYTHEMIA: Status: ACTIVE | Noted: 2017-03-09

## 2017-03-09 PROBLEM — R73.9 HYPERGLYCEMIA: Status: ACTIVE | Noted: 2017-03-09

## 2017-03-09 LAB
ANION GAP SERPL CALC-SCNC: 6 MMOL/L (ref 0–11.9)
BUN SERPL-MCNC: 26 MG/DL (ref 8–22)
CALCIUM SERPL-MCNC: 8 MG/DL (ref 8.5–10.5)
CHLORIDE SERPL-SCNC: 98 MMOL/L (ref 96–112)
CO2 SERPL-SCNC: 25 MMOL/L (ref 20–33)
CREAT SERPL-MCNC: 1.28 MG/DL (ref 0.5–1.4)
ERYTHROCYTE [DISTWIDTH] IN BLOOD BY AUTOMATED COUNT: 53.5 FL (ref 35.9–50)
EST. AVERAGE GLUCOSE BLD GHB EST-MCNC: 128 MG/DL
GFR SERPL CREATININE-BSD FRML MDRD: 40 ML/MIN/1.73 M 2
GLUCOSE SERPL-MCNC: 188 MG/DL (ref 65–99)
HBA1C MFR BLD: 6.1 % (ref 0–5.6)
HCT VFR BLD AUTO: 49.3 % (ref 37–47)
HGB BLD-MCNC: 15.4 G/DL (ref 12–16)
LV EJECT FRACT  99904: 70
LV EJECT FRACT MOD 2C 99903: 75.82
LV EJECT FRACT MOD 4C 99902: 76.33
LV EJECT FRACT MOD BP 99901: 74.85
MCH RBC QN AUTO: 30.4 PG (ref 27–33)
MCHC RBC AUTO-ENTMCNC: 31.2 G/DL (ref 33.6–35)
MCV RBC AUTO: 97.2 FL (ref 81.4–97.8)
PLATELET # BLD AUTO: 237 K/UL (ref 164–446)
PMV BLD AUTO: 10.2 FL (ref 9–12.9)
POTASSIUM SERPL-SCNC: 5.1 MMOL/L (ref 3.6–5.5)
RBC # BLD AUTO: 5.07 M/UL (ref 4.2–5.4)
SODIUM SERPL-SCNC: 129 MMOL/L (ref 135–145)
TROPONIN I SERPL-MCNC: 0.02 NG/ML (ref 0–0.04)
TROPONIN I SERPL-MCNC: 0.03 NG/ML (ref 0–0.04)
WBC # BLD AUTO: 9 K/UL (ref 4.8–10.8)

## 2017-03-09 PROCEDURE — 99407 BEHAV CHNG SMOKING > 10 MIN: CPT

## 2017-03-09 PROCEDURE — 36415 COLL VENOUS BLD VENIPUNCTURE: CPT

## 2017-03-09 PROCEDURE — 80048 BASIC METABOLIC PNL TOTAL CA: CPT

## 2017-03-09 PROCEDURE — A9270 NON-COVERED ITEM OR SERVICE: HCPCS | Performed by: INTERNAL MEDICINE

## 2017-03-09 PROCEDURE — 83036 HEMOGLOBIN GLYCOSYLATED A1C: CPT

## 2017-03-09 PROCEDURE — 3E0234Z INTRODUCTION OF SERUM, TOXOID AND VACCINE INTO MUSCLE, PERCUTANEOUS APPROACH: ICD-10-PCS | Performed by: INTERNAL MEDICINE

## 2017-03-09 PROCEDURE — 700101 HCHG RX REV CODE 250: Performed by: INTERNAL MEDICINE

## 2017-03-09 PROCEDURE — 94760 N-INVAS EAR/PLS OXIMETRY 1: CPT

## 2017-03-09 PROCEDURE — 90471 IMMUNIZATION ADMIN: CPT

## 2017-03-09 PROCEDURE — 700111 HCHG RX REV CODE 636 W/ 250 OVERRIDE (IP): Performed by: INTERNAL MEDICINE

## 2017-03-09 PROCEDURE — 85027 COMPLETE CBC AUTOMATED: CPT

## 2017-03-09 PROCEDURE — 770020 HCHG ROOM/CARE - TELE (206)

## 2017-03-09 PROCEDURE — 700105 HCHG RX REV CODE 258: Performed by: INTERNAL MEDICINE

## 2017-03-09 PROCEDURE — 94640 AIRWAY INHALATION TREATMENT: CPT

## 2017-03-09 PROCEDURE — 700102 HCHG RX REV CODE 250 W/ 637 OVERRIDE(OP): Performed by: INTERNAL MEDICINE

## 2017-03-09 PROCEDURE — 93306 TTE W/DOPPLER COMPLETE: CPT | Mod: 26 | Performed by: INTERNAL MEDICINE

## 2017-03-09 PROCEDURE — 93306 TTE W/DOPPLER COMPLETE: CPT

## 2017-03-09 PROCEDURE — 84484 ASSAY OF TROPONIN QUANT: CPT

## 2017-03-09 PROCEDURE — 99233 SBSQ HOSP IP/OBS HIGH 50: CPT | Performed by: INTERNAL MEDICINE

## 2017-03-09 PROCEDURE — 90670 PCV13 VACCINE IM: CPT | Performed by: INTERNAL MEDICINE

## 2017-03-09 RX ORDER — ALBUTEROL SULFATE 90 UG/1
2 AEROSOL, METERED RESPIRATORY (INHALATION)
Status: DISCONTINUED | OUTPATIENT
Start: 2017-03-09 | End: 2017-03-11

## 2017-03-09 RX ORDER — IPRATROPIUM BROMIDE AND ALBUTEROL SULFATE 2.5; .5 MG/3ML; MG/3ML
3 SOLUTION RESPIRATORY (INHALATION)
Status: DISCONTINUED | OUTPATIENT
Start: 2017-03-09 | End: 2017-03-10

## 2017-03-09 RX ORDER — BUDESONIDE AND FORMOTEROL FUMARATE DIHYDRATE 160; 4.5 UG/1; UG/1
2 AEROSOL RESPIRATORY (INHALATION)
Status: DISCONTINUED | OUTPATIENT
Start: 2017-03-09 | End: 2017-03-11

## 2017-03-09 RX ORDER — FUROSEMIDE 10 MG/ML
20 INJECTION INTRAMUSCULAR; INTRAVENOUS
Status: DISCONTINUED | OUTPATIENT
Start: 2017-03-09 | End: 2017-03-10

## 2017-03-09 RX ORDER — TIOTROPIUM BROMIDE 18 UG/1
1 CAPSULE ORAL; RESPIRATORY (INHALATION)
Status: DISCONTINUED | OUTPATIENT
Start: 2017-03-09 | End: 2017-03-11

## 2017-03-09 RX ORDER — METHYLPREDNISOLONE SODIUM SUCCINATE 125 MG/2ML
62.5 INJECTION, POWDER, LYOPHILIZED, FOR SOLUTION INTRAMUSCULAR; INTRAVENOUS EVERY 6 HOURS
Status: DISCONTINUED | OUTPATIENT
Start: 2017-03-09 | End: 2017-03-11

## 2017-03-09 RX ADMIN — IPRATROPIUM BROMIDE AND ALBUTEROL SULFATE 3 ML: .5; 3 SOLUTION RESPIRATORY (INHALATION) at 20:09

## 2017-03-09 RX ADMIN — LEVOTHYROXINE SODIUM 50 MCG: 50 TABLET ORAL at 05:40

## 2017-03-09 RX ADMIN — FUROSEMIDE 20 MG: 10 INJECTION, SOLUTION INTRAVENOUS at 09:03

## 2017-03-09 RX ADMIN — TRAZODONE HYDROCHLORIDE 50 MG: 50 TABLET ORAL at 20:43

## 2017-03-09 RX ADMIN — AZITHROMYCIN 500 MG: 500 INJECTION, POWDER, LYOPHILIZED, FOR SOLUTION INTRAVENOUS at 21:30

## 2017-03-09 RX ADMIN — ONDANSETRON 4 MG: 2 INJECTION, SOLUTION INTRAMUSCULAR; INTRAVENOUS at 17:59

## 2017-03-09 RX ADMIN — BUDESONIDE AND FORMOTEROL FUMARATE DIHYDRATE 2 PUFF: 160; 4.5 AEROSOL RESPIRATORY (INHALATION) at 20:09

## 2017-03-09 RX ADMIN — TIOTROPIUM BROMIDE 1 CAPSULE: 18 CAPSULE ORAL; RESPIRATORY (INHALATION) at 07:07

## 2017-03-09 RX ADMIN — CEFTRIAXONE 2 G: 2 INJECTION, POWDER, FOR SOLUTION INTRAMUSCULAR; INTRAVENOUS at 20:43

## 2017-03-09 RX ADMIN — SIMVASTATIN 40 MG: 40 TABLET, FILM COATED ORAL at 20:44

## 2017-03-09 RX ADMIN — ALPRAZOLAM 1 MG: 0.5 TABLET ORAL at 23:58

## 2017-03-09 RX ADMIN — DILTIAZEM HYDROCHLORIDE 180 MG: 180 CAPSULE, COATED, EXTENDED RELEASE ORAL at 20:44

## 2017-03-09 RX ADMIN — BUDESONIDE AND FORMOTEROL FUMARATE DIHYDRATE 2 PUFF: 160; 4.5 AEROSOL RESPIRATORY (INHALATION) at 07:07

## 2017-03-09 RX ADMIN — IPRATROPIUM BROMIDE AND ALBUTEROL SULFATE 3 ML: .5; 3 SOLUTION RESPIRATORY (INHALATION) at 07:06

## 2017-03-09 RX ADMIN — METHYLPREDNISOLONE SODIUM SUCCINATE 40 MG: 40 INJECTION, POWDER, FOR SOLUTION INTRAMUSCULAR; INTRAVENOUS at 05:41

## 2017-03-09 RX ADMIN — HEPARIN SODIUM 5000 UNITS: 5000 INJECTION, SOLUTION INTRAVENOUS; SUBCUTANEOUS at 05:40

## 2017-03-09 RX ADMIN — HEPARIN SODIUM 5000 UNITS: 5000 INJECTION, SOLUTION INTRAVENOUS; SUBCUTANEOUS at 20:43

## 2017-03-09 RX ADMIN — ONDANSETRON 4 MG: 2 INJECTION, SOLUTION INTRAMUSCULAR; INTRAVENOUS at 09:00

## 2017-03-09 RX ADMIN — FUROSEMIDE 20 MG: 10 INJECTION, SOLUTION INTRAVENOUS at 16:00

## 2017-03-09 RX ADMIN — IPRATROPIUM BROMIDE AND ALBUTEROL SULFATE 3 ML: .5; 3 SOLUTION RESPIRATORY (INHALATION) at 23:47

## 2017-03-09 RX ADMIN — OMEPRAZOLE 20 MG: 20 CAPSULE, DELAYED RELEASE ORAL at 20:44

## 2017-03-09 RX ADMIN — HYDRALAZINE HYDROCHLORIDE 50 MG: 50 TABLET ORAL at 09:05

## 2017-03-09 RX ADMIN — IPRATROPIUM BROMIDE AND ALBUTEROL SULFATE 3 ML: .5; 3 SOLUTION RESPIRATORY (INHALATION) at 14:23

## 2017-03-09 RX ADMIN — HYDRALAZINE HYDROCHLORIDE 50 MG: 50 TABLET ORAL at 20:44

## 2017-03-09 RX ADMIN — ASPIRIN 81 MG: 81 TABLET ORAL at 09:05

## 2017-03-09 RX ADMIN — OMEPRAZOLE 20 MG: 20 CAPSULE, DELAYED RELEASE ORAL at 09:04

## 2017-03-09 RX ADMIN — METHYLPREDNISOLONE SODIUM SUCCINATE 62.5 MG: 125 INJECTION, POWDER, FOR SOLUTION INTRAMUSCULAR; INTRAVENOUS at 17:58

## 2017-03-09 RX ADMIN — ONDANSETRON 4 MG: 2 INJECTION, SOLUTION INTRAMUSCULAR; INTRAVENOUS at 22:01

## 2017-03-09 RX ADMIN — DILTIAZEM HYDROCHLORIDE 180 MG: 180 CAPSULE, COATED, EXTENDED RELEASE ORAL at 09:05

## 2017-03-09 RX ADMIN — SERTRALINE 100 MG: 100 TABLET, FILM COATED ORAL at 20:44

## 2017-03-09 RX ADMIN — Medication 2 TABLET: at 09:05

## 2017-03-09 RX ADMIN — PNEUMOCOCCAL 13-VALENT CONJUGATE VACCINE 0.5 ML: 2.2; 2.2; 2.2; 2.2; 2.2; 4.4; 2.2; 2.2; 2.2; 2.2; 2.2; 2.2; 2.2 INJECTION, SUSPENSION INTRAMUSCULAR at 05:41

## 2017-03-09 RX ADMIN — IPRATROPIUM BROMIDE AND ALBUTEROL SULFATE 3 ML: .5; 3 SOLUTION RESPIRATORY (INHALATION) at 10:31

## 2017-03-09 RX ADMIN — METHYLPREDNISOLONE SODIUM SUCCINATE 62.5 MG: 125 INJECTION, POWDER, FOR SOLUTION INTRAMUSCULAR; INTRAVENOUS at 13:40

## 2017-03-09 RX ADMIN — HEPARIN SODIUM 5000 UNITS: 5000 INJECTION, SOLUTION INTRAVENOUS; SUBCUTANEOUS at 16:29

## 2017-03-09 ASSESSMENT — ENCOUNTER SYMPTOMS
CHILLS: 0
NEUROLOGICAL NEGATIVE: 1
MUSCULOSKELETAL NEGATIVE: 1
SPUTUM PRODUCTION: 1
ABDOMINAL PAIN: 0
COUGH: 1
VOMITING: 0
HEARTBURN: 0
WHEEZING: 1
ORTHOPNEA: 0
SHORTNESS OF BREATH: 1
PSYCHIATRIC NEGATIVE: 1
EYES NEGATIVE: 1
FEVER: 0
PALPITATIONS: 0
NAUSEA: 0

## 2017-03-09 ASSESSMENT — COPD QUESTIONNAIRES
DURING THE PAST 4 WEEKS HOW MUCH DID YOU FEEL SHORT OF BREATH: MOST  OR ALL OF THE TIME
COPD SCREENING SCORE: 10
HAVE YOU SMOKED AT LEAST 100 CIGARETTES IN YOUR ENTIRE LIFE: YES
DO YOU EVER COUGH UP ANY MUCUS OR PHLEGM?: YES, EVERY DAY

## 2017-03-09 ASSESSMENT — PAIN SCALES - GENERAL
PAINLEVEL_OUTOF10: 0

## 2017-03-09 ASSESSMENT — LIFESTYLE VARIABLES: EVER_SMOKED: YES

## 2017-03-09 NOTE — H&P
ATTENDING:  [facility] hospitalist.    PRIMARY CARE PHYSICIAN:  Denver Miller, MD    CODE STATUS:  Full code.    CHIEF COMPLAINT:  Shortness of breath.    HISTORY OF PRESENT ILLNESS:  This is a 76-year-old female who presented to the   emergency department due to shortness of breath.  Patient states it has been   going on for months, she has been checking her pulse ox at home periodically   and noted to be in the 60s and 70s.  Patient states she has just been feeling   terrible with no improvement.  Patient did follow up with someone and obtained   a chest x-ray; however, they have been unable to obtain the results.  Patient   does complain of some chills.  Patient states she has slowly been getting   worse, does complain of a cough with white sputum.  Patient states her   daughter called 911.     PAST MEDICAL HISTORY:  1.  Hypertension.    2.  Chronic kidney disease.   3.  Hypothyroidism.   4.  Depression.   5.  Chronic pain.   6.  COPD.     PAST SURGICAL HISTORY:  None.    MEDICATIONS:   1.  Albuterol 1-2 puffs q. 4 hours p.r.n.   2.  Xanax 0.5 mg t.i.d. p.r.n.   3.  Aspirin 81 mg daily.   4.  Diltiazem 180 mg b.i.d.   5.  Hydralazine 50 mg b.i.d.   6.  Hydrochlorothiazide 25 mg daily.   7.  Norco 10/325 q. 6 hours p.r.n.   8.  Ibuprofen 600 mg daily.   9.  DuoNeb 3 mL nebulized 4 times a day.   10.  Irbesartan 300 mg daily.   11.  Synthroid 50 mcg daily.   12.  Singulair 10 mg daily.   13.  Niacin 500 mg b.i.d.   14.  Omeprazole 20 mg b.i.d.   15.  Zoloft 100 mg daily.   16.  Simvastatin 40 mg daily.   17.  Temazepam 15-30 mg at bedtime p.r.n.   18.  Spiriva 18 mcg daily.   19.  Trazodone 50 mg daily.     ALLERGIES:  No known drug allergies.    SOCIAL HISTORY:  She quit smoking 3 days ago.  Prior to that, she was smoking   up to 8 cigarettes per day and began smoking at the age of 15.  She denies   alcohol or illicit drug use.    FAMILY HISTORY:  Patient denies any knowledge of medical problems in the    family.    REVIEW OF SYSTEMS:  Complete review of systems obtained with positives noted   in the HPI above, all others negative.    PHYSICAL EXAMINATION:  VITAL SIGNS:  Temperature 36.9, pulse 69, respirations 18, blood pressure   168/59 and satting 90% on 10 liter mask.    GENERAL:  No acute distress, alert and oriented x3.   HEENT:  Normocephalic, atraumatic, moist mucous membranes.   NECK:  No JVD, bruit, thyromegaly, cervical or supraclavicular adenopathy   noted.   CARDIOVASCULAR:  Regular rate and rhythm.  No murmurs, rubs or gallops.   LUNGS:  Decreased breath sounds globally with an expiratory wheeze as well as   right basilar crackles.    ABDOMEN:  Bowel sounds x4, soft, nontender, nondistended, no   hepatosplenomegaly.   EXTREMITIES:  Bilateral lower extremity edema, no clubbing or cyanosis.   SKIN:  No rash or erythema.   NEUROLOGIC:  Cranial nerves II-XII intact.  Extraocular muscles intact.    LABORATORY DATA:  White count 9.3, hemoglobin 16.5, hematocrit 50.3 and   platelets 276.  Sodium 135, potassium 4.6, chloride 102, CO2 of 28, BUN 23,   creatinine 1.07, glucose is 122.    IMAGING:  There was a chest x-ray that showed cardiomegaly with bibasilar   opacities.    ASSESSMENT AND PLAN:  1.  Chronic obstructive pulmonary disease exacerbation -- significant, patient   is now satting 90% on 10 liter mask, she does quickly desaturate if she takes   the mask off.  I have discussed with respiratory therapy and think she would   benefit from high flow nasal cannula, patient will be placed on telemetry and   this will be obtained.  We will give IV antibiotics, IV steroids as well as   frequent nebulization.  Patient will require at least 2 days treatment in the   hospital.    2.  Acute respiratory failure -- secondary to chronic obstructive pulmonary   disease exacerbation, we will try high flow.   3.  Polycythemia -- likely secondary to some dehydration, we will give IV   fluids and repeat a CBC in the  morning.   4.  Hypothyroidism -- continue Synthroid.   5.  Hypertension -- continue home medications except for her   hydrochlorothiazide patient does have chronic kidney disease, we will also   place p.r.n. and adjust as necessary.  6.  Chronic kidney disease -- we will give IV fluids and repeat BMP in the   morning.    7.  Chronic pain -- symptomatic care.    8.  Depression.  Continue home Zoloft.       ____________________________________     DO ANAHY BASS / NTS    DD:  03/09/2017 01:39:27  DT:  03/09/2017 02:00:21    D#:  [id number]  Job#:  [id number]

## 2017-03-09 NOTE — PROGRESS NOTES
Hospital Medicine Progress Note, Adult, Complex               Author: Glenn Loo Date & Time created: 3/9/2017  7:42 AM     CC: SOB    Interval History:  75 y/o F with PMH of HTN, COPD, CKD, hypothyroid: admitted for above    Still having SOB, cough.    Review of Systems:  Review of Systems   Constitutional: Positive for malaise/fatigue. Negative for fever and chills.   HENT: Negative.    Eyes: Negative.    Respiratory: Positive for cough, sputum production, shortness of breath and wheezing.    Cardiovascular: Negative for chest pain, palpitations and orthopnea.   Gastrointestinal: Negative for heartburn, nausea, vomiting and abdominal pain.   Genitourinary: Negative.    Musculoskeletal: Negative.    Neurological: Negative.    Psychiatric/Behavioral: Negative.        Physical Exam:  Physical Exam   Constitutional: She is oriented to person, place, and time. No distress.   HENT:   Head: Normocephalic and atraumatic.   Mouth/Throat: Oropharynx is clear and moist.   Eyes: Conjunctivae and EOM are normal. Pupils are equal, round, and reactive to light.   Neck: Normal range of motion. Neck supple. No thyromegaly present.   Cardiovascular: Normal rate and regular rhythm.    No murmur heard.  Pulmonary/Chest: Effort normal and breath sounds normal. No respiratory distress.   Abdominal: Soft. Bowel sounds are normal. She exhibits no distension.   Musculoskeletal: She exhibits no edema or tenderness.   Neurological: She is alert and oriented to person, place, and time. No cranial nerve deficit.   Skin: Skin is warm and dry. She is not diaphoretic. No erythema.   Psychiatric: She has a normal mood and affect. Her behavior is normal. Thought content normal.       Labs:        Invalid input(s): [id number]  Recent Labs      03/08/17   1730  03/09/17   0145   TROPONINI  0.05*  0.03   BNPBTYPENAT  227*   --      Recent Labs      03/08/17   1910  03/09/17   0145   SODIUM  135  129*   POTASSIUM  4.6  5.1   CHLORIDE  102  98    CO2  28  25   BUN  23*  26*   CREATININE  1.07  1.28   CALCIUM  8.4*  8.0*     Recent Labs      17   1910  17   0145   GLUCOSE  122*  188*     Recent Labs      17   1730  17   0145   RBC  5.32  5.07   HEMOGLOBIN  16.5*  15.4   HEMATOCRIT  50.3*  49.3*   PLATELETCT  276  237     Recent Labs      17   1730  17   0145   WBC  9.3  9.0   NEUTSPOLYS  71.10   --    LYMPHOCYTES  18.10*   --    MONOCYTES  8.90   --    EOSINOPHILS  1.00   --    BASOPHILS  0.50   --            Hemodynamics:  Temp (24hrs), Av.6 °C (97.9 °F), Min:36.2 °C (97.2 °F), Max:36.9 °C (98.4 °F)  Temperature: 36.4 °C (97.6 °F)  Pulse  Av.8  Min: 64  Max: 75Heart Rate (Monitored): 70  Blood Pressure : 150/62 mmHg, NIBP: 104/64 mmHg     Respiratory:    Respiration: 18, Pulse Oximetry: 96 %, O2 Daily Delivery Respiratory : OxyMask     Given By:: Mouthpiece, #MDI/DPI Given: MDI/DPI x 2, Work Of Breathing / Effort: Mild  RUL Breath Sounds: Expiratory Wheezes, RML Breath Sounds: Expiratory Wheezes;Diminished, RLL Breath Sounds: Diminished, JOSE ALEJANDRO Breath Sounds: Expiratory Wheezes, LLL Breath Sounds: Diminished;Expiratory Wheezes  Fluids:  No intake or output data in the 24 hours ending 17 0742  Weight: 71.1 kg (156 lb 12 oz)  GI/Nutrition:  Orders Placed This Encounter   Procedures   • Diet Order     Standing Status: Standing      Number of Occurrences: 1      Standing Expiration Date:      Order Specific Question:  Diet:     Answer:  Regular [1]     Medical Decision Making, by Problem:  Active Hospital Problems    Diagnosis   • Acute respiratory failure (CMS-HCC) [J96.00]  - from COPD exacerbation and CAP  - on IV abx with ceftriaxone, azithromycin  - on aggressive breathing treatment, duoneb, steroid, albuterol     • Depression [F32.9]     • Polycythemia [D75.1]  - secondary to COPD     • Hyponatremia [E87.1]  - worsening  - follow cmp in am     • Hyperglycemia [R73.9]  - a1c: pending     • Elevated brain  natriuretic peptide (BNP) level [R79.89]  - will check Echo  - started on lasix 20 mg IV bid     • Elevated troponin [R79.89]  - likely demand ischemia     • COPD exacerbation (CMS-HCC) [J44.1]  - plan as above     • CKD (chronic kidney disease) [N18.9]  - avoid nephrotoxic drugs  - follow cmp in am     • HTN (hypertension) [I10]     • Hypothyroid [E03.9]     • Chronic pain, neck/narcotics [G89.29]       Labs reviewed, Radiology images reviewed and Medications reviewed        DVT Prophylaxis: Heparin      Antibiotics: Treating active infection/contamination beyond 24 hours perioperative coverage

## 2017-03-09 NOTE — ED NOTES
Chief Complaint   Patient presents with   • Shortness of Breath     64% on RA at home, duoneb tx at home   • Cough     x 2 weeks     BIB [facility] from home. Pt had on duoneb tx at home and 2 duoneb + 1 albuterol neb tx en route. Pt reports improvement after treatment. Dyspnea increases on exertion. Chart up for ERP.

## 2017-03-09 NOTE — ED NOTES
Report to TRANG Dodd. Room still waiting to be cleaned. Continuing to monitor pt. Sleeping on gurney comfortably.

## 2017-03-09 NOTE — ED NOTES
Pt's O2 sats steady while sleeping at 89-92% on 10L mask. No changes to O2 at this time. Waiting for room to be cleaned.

## 2017-03-09 NOTE — PROGRESS NOTES
2 RN skin assessment completed with Jeanette RN. Red, blanching bilateral ears. Red, blanching bilateral elbows. Red, blanching heels and calloused feet. Generalized dry flaky skin. Left hand first finger partial amputation.

## 2017-03-09 NOTE — RESPIRATORY CARE
COPD EDUCATION by COPD CLINICAL EDUCATOR  3/9/2017  at  10:59 AM by Symone Hawk     Patient interviewed by COPD education team.  Patient unable to participate in full program.  Short intervention has been conducted.  A comprehensive packet including information about COPD, treatments, and smoking cessation given and discussed.

## 2017-03-09 NOTE — CARE PLAN
Problem: Safety  Goal: Will remain free from injury  Outcome: PROGRESSING AS EXPECTED  Call light within reach, hourly rounding in practice.        Problem: Knowledge Deficit  Goal: Knowledge of disease process/condition, treatment plan, diagnostic tests, and medications will improve  Outcome: PROGRESSING AS EXPECTED  Discussed POC and medications with patient, pt. verbalized understanding.

## 2017-03-09 NOTE — PROGRESS NOTES
Received report from Afshan COSTELLO. Pt arrived on floor with transport. Telebox on and monitor room notified. Pt oriented to room and all safety measures in place.

## 2017-03-09 NOTE — ED NOTES
ERP notified about med request, granted. Pt medicated, swallows pills w/o difficulty. Pt updated on POC and new abx orders, pt in agreement with POC.

## 2017-03-09 NOTE — ED PROVIDER NOTES
ED Provider Note    Scribed for Edgardo Galvez M.D. by Nicolette Lr. 3/8/2017  5:23 PM    Primary care provider: Denver J Miller, M.D.  Means of arrival: Ambulance   History obtained from: Patient   History limited by: None     CHIEF COMPLAINT  Chief Complaint   Patient presents with   • Shortness of Breath     64% on RA at home, duoneb tx at home   • Cough     x 2 weeks     HPI  Kyaw Cuevas is a 76 y.o. Female with medical history of COPD and chronic shortness of breath who presents to the Emergency Department by ambulance for cough and shortness of breath, with a pulse ox reading of 64% on room air today, exacerbated upon exertion, onset two months ago and worsened over the past two weeks.The patient's daughter states that she has had cyanotic lips intermittently when she does any activity including walking to the bathroom causing her to be more sedentary recently. Per patient took a duoneb treatment at home and was given two breathing treatments en route to the emergency department with some improvement to her breathing. The patient notes that she has been using 5-6 breathing treatments with her nebulizer at home for the past few weeks and prior only needed one a day. Per patient's daughter she has a pulse ox at home and has had readings in the low 70s lately. She denies being on supplemental oxygen at home. She denies any recent fevers, chills, unexplained weight loss, leg swelling or pain. She denies every being admitted for shortness of breath in the past. The patient states that she was seen and evaluated by her primary care three weeks ago for the same and never went for recheck.  The patient states that she takes all her medications daily. She denies any history of heart failure or myocardial infarction. Per patient she smoked a pack a day for 50 years and has been smoking one cigarette daily for the past week. She states that she has not been able to smoke the past two days.    REVIEW OF  SYSTEMS  Pertinent positives include shortness of breath, cough, cyanotic lips, low pulse ox reading. Pertinent negatives include no fevers, chills, unexplained weight loss, dysuria, leg swelling or pain.  All other systems reviewed and negative.  C  PAST MEDICAL HISTORY   past medical history of COPD    SURGICAL HISTORY  patient denies any surgical history    SOCIAL HISTORY  Social History   Substance Use Topics   • Smoking status: Former Smoker -- 0.50 packs/day for 50 years     Types: Cigarettes     Start date: 02/15/1967     Quit date: 02/08/2017   • Smokeless tobacco: Never Used   • Alcohol Use: No      History   Drug Use No     FAMILY HISTORY  Family History   Problem Relation Age of Onset   • Other Neg Hx      CURRENT MEDICATIONS  No current facility-administered medications on file prior to encounter.     Current Outpatient Prescriptions on File Prior to Encounter   Medication Sig Dispense Refill   • alprazolam (XANAX) 0.5 MG TABS Take 0.5 mg by mouth 3 times a day as needed.     • aspirin EC (ECOTRIN) 81 MG TBEC Take 81 mg by mouth every day.     • irbesartan (AVAPRO) 300 MG TABS Take 300 mg by mouth every day.     • diltiazem CD (CARTIA XT) 180 MG CP24 Take 180 mg by mouth 2 Times a Day.     • ibuprofen (MOTRIN) 200 MG TABS Take 200 mg by mouth as needed.     • hydrALAZINE (APRESOLINE) 50 MG TABS Take 50 mg by mouth 2 times a day as needed.     • hydrocodone-acetaminophen (LORTAB)  MG per tablet Take 1 Tab by mouth every 6 hours as needed.     • levothyroxine (SYNTHROID) 50 MCG TABS Take 50 mcg by mouth every day.     • nitroglycerin (NITROSTAT) 0.4 MG SUBL Place 0.4 mg under tongue every 5 minutes as needed.     • albuterol (VENTOLIN OR PROVENTIL) 108 (90 BASE) MCG/ACT AERS Inhale 1-2 Puffs by mouth every four hours as needed.     • ranitidine (ZANTAC) 150 MG TABS Take 150 mg by mouth every day.     • sertraline (ZOLOFT) 100 MG TABS Take 100 mg by mouth every day.     • montelukast (SINGULAIR) 10 MG  "TABS Take 10 mg by mouth every evening.     • tiotropium (SPIRIVA) 18 MCG CAPS Inhale 18 mcg by mouth every day.     • trazodone (DESYREL) 50 MG TABS Take 50 mg by mouth every evening.     hydrochlorothiazide   Omeprazole  Irbesartan   norco  trazadone    ALLERGIES  No Known Allergies    PHYSICAL EXAM  VITAL SIGNS: /59 mmHg  Pulse 69  Temp(Src) 36.9 °C (98.4 °F)  Resp 18  Ht 1.575 m (5' 2\")  Wt 70.308 kg (155 lb)  BMI 28.34 kg/m2  SpO2 93%    Vital signs reviewed.  Constitutional:  Appears well-developed and well-nourished. No distress.   Head: Normocephalic.   Mouth/Throat: Oropharynx is clear and moist.   Eyes: EOM are normal. Pupils are equal, round, and reactive to light.   Neck: Normal range of motion. Neck supple.   Cardiovascular: Normal rate, regular rhythm and normal heart sounds.    Pulmonary/Chest: moderate expiratory wheezing and inspiratory crackles bilaterally   Abdominal: Soft. There is no tenderness. There is no rebound and no guarding.   Musculoskeletal: pitting edema bilateral lower extremities   Lymphadenopathy: No cervical adenopathy.   Neurological: Patient is alert and oriented to person, place, and time. CNs II - XII intact. DTRs intact. Normal sensation and strength.  Skin: Skin is warm and dry.   Psychiatric: Patient has a normal mood and affect. Behavior is normal.     LABS  Results for orders placed or performed during the hospital encounter of 03/08/17   CBC WITH DIFFERENTIAL   Result Value Ref Range    WBC 9.3 4.8 - 10.8 K/uL    RBC 5.32 4.20 - 5.40 M/uL    Hemoglobin 16.5 (H) 12.0 - 16.0 g/dL    Hematocrit 50.3 (H) 37.0 - 47.0 %    MCV 94.5 81.4 - 97.8 fL    MCH 31.0 27.0 - 33.0 pg    MCHC 32.8 (L) 33.6 - 35.0 g/dL    RDW 55.8 (H) 35.9 - 50.0 fL    Platelet Count 276 164 - 446 K/uL    MPV 9.5 9.0 - 12.9 fL    Neutrophils-Polys 71.10 44.00 - 72.00 %    Lymphocytes 18.10 (L) 22.00 - 41.00 %    Monocytes 8.90 0.00 - 13.40 %    Eosinophils 1.00 0.00 - 6.90 %    Basophils 0.50 " 0.00 - 1.80 %    Immature Granulocytes 0.40 0.00 - 0.90 %    Nucleated RBC 0.00 /100 WBC    Neutrophils (Absolute) 6.63 2.00 - 7.15 K/uL    Lymphs (Absolute) 1.69 1.00 - 4.80 K/uL    Monos (Absolute) 0.83 0.00 - 0.85 K/uL    Eos (Absolute) 0.09 0.00 - 0.51 K/uL    Baso (Absolute) 0.05 0.00 - 0.12 K/uL    Immature Granulocytes (abs) 0.04 0.00 - 0.11 K/uL    NRBC (Absolute) 0.00 K/uL   TROPONIN   Result Value Ref Range    Troponin I 0.05 (H) 0.00 - 0.04 ng/mL   BTYPE NATRIURETIC PEPTIDE   Result Value Ref Range    B Natriuretic Peptide 227 (H) 0 - 100 pg/mL   BASIC METABOLIC PANEL   Result Value Ref Range    Sodium 135 135 - 145 mmol/L    Potassium 4.6 3.6 - 5.5 mmol/L    Chloride 102 96 - 112 mmol/L    Co2 28 20 - 33 mmol/L    Glucose 122 (H) 65 - 99 mg/dL    Bun 23 (H) 8 - 22 mg/dL    Creatinine 1.07 0.50 - 1.40 mg/dL    Calcium 8.4 (L) 8.5 - 10.5 mg/dL    Anion Gap 5.0 0.0 - 11.9   ESTIMATED GFR   Result Value Ref Range    GFR If African American >60 >60 mL/min/1.73 m 2    GFR If Non African American 50 (A) >60 mL/min/1.73 m 2   EKG (ER)   Result Value Ref Range    Report       [facility] Emergency Dept.    Test Date:  2017  Pt Name:    ANIYA LOZANO             Department: ER  MRN:        [id number]                      Room:       [facility]  Gender:     F                            Technician: [id number]  :        [id number]                   Requested By:BASSEM CORONA  Order #:    [id number]                    Kenna MD:    Measurements  Intervals                                Axis  Rate:       65                           P:          65  ID:         [id number]                          QRS:        119  QRSD:       80                           T:          84  QT:         448  QTc:        466    Interpretive Statements  SINUS RHYTHM  RIGHT AXIS DEVIATION  BORDERLINE R WAVE PROGRESSION, ANTERIOR LEADS  ABNORMAL T, PROBABLE ISCHEMIA, ANT-LAT LEADS  BASELINE WANDER IN LEAD(S) I,III,aVL,aVF  No previous  ECG available for comparison     All labs reviewed by me.    EKG  12 Lead EKG interpreted by me to show sinus rhythm at 60. Normal P waves. Late R wave progression. Q waves in V1, V2 and V3. Normal ST segments. T waves inverted in V1 and AVL. Abnormal EKG. No prior     RADIOLOGY  DX-CHEST-PORTABLE (1 VIEW)   Final Result      Cardiomegaly.      Bibasilar opacities which may represent subtle infiltrates or atelectasis.      The radiologist's interpretation of all radiological studies have been reviewed by me.    COURSE & MEDICAL DECISION MAKING  Pertinent Labs & Imaging studies reviewed. (See chart for details) The patient's [facility] and past medical  records were reviewed    5:23 PM - Patient seen and examined at bedside with daughter who she lives with. Patient will be treated with Proventil nebulizer, Atrovent nebulizer, 125 mg Solu-medrol IV, 1 mg Xanax PO. Ordered chest x-ray, CBC with diff, BMP, Troponin, BNP, Blood cultures and an EKG to evaluate her symptoms. Course of evaluation, treatment and need for admittance was discussed with patient, daughter and granddaughter. They understand, all questions were addressed at this time.     5:55 PM- Recheck on the patient. She is 90% on 10L of oxygen at this time. I explained that the patient would be admitted to the hospital for treatment. She understands and agrees. Discussed wishes for code status with the patient. The patient wishes to be DNR and DNI.     7:43 PM- Patient treated with 2 g Rocephin IV, 500 mg Zithromax IV after findings of pneumonia.     7:54 PM Paged hospitalist.     7:56 PM I discussed the patient's case and the above findings with Dr. Joseph (hospitalist) who agrees to admit the patient.     DISPOSITION:  Patient will be admitted to Dr. Cuevas (hospitalist) in guarded condition.    FINAL IMPRESSION  1. Chronic obstructive pulmonary disease with acute exacerbation (CMS-HCC)    2. Hypoxia    3. Congestive heart failure, unspecified  congestive heart failure chronicity, unspecified congestive heart failure type (CMS-HCC)     4. Non-STEMI      I, Nicolette Lr (Scribe), am scribing for, and in the presence of, Edgardo Galvez M.D..    Electronically signed by: Nicolette Lr (Scribe), 3/8/2017    IEdgardo M.D. personally performed the services described in this documentation, as scribed by Nicolette Lr in my presence, and it is both accurate and complete.    The note accurately reflects work and decisions made by me.  Edgardo Galvez  3/8/2017  8:21 PM

## 2017-03-09 NOTE — ED NOTES
Bedside report from TRANG Wolff. Assuming pt care. Pt remains on 10L mask sating 87-90% but talking in full sentences and appears comfortably. Pt reports being anxious from multiple breathing tx, requesting a dose of xanax (which she takes at home). Will notify ERP. Monitors in place. Pt resting comfortably otherwise.

## 2017-03-09 NOTE — ED NOTES
1st dose IV abx started. VSS. Pt resting on gurney with call light in hand. Denies any other needs at this time. Aware of POC, continuing to monitor.

## 2017-03-09 NOTE — ED NOTES
Family gone home for night.  Contact daughter Christina [phone] for any needs (number in chart too).    2nd IV abx started. VSS. Pt given pillows for comfort. Pt ready for rest. Continuing to monitor, waiting for room assignment.

## 2017-03-10 PROBLEM — N17.9 AKI (ACUTE KIDNEY INJURY) (HCC): Status: ACTIVE | Noted: 2017-03-10

## 2017-03-10 LAB
ALBUMIN SERPL BCP-MCNC: 3 G/DL (ref 3.2–4.9)
ALBUMIN/GLOB SERPL: 1 G/DL
ALP SERPL-CCNC: 123 U/L (ref 30–99)
ALT SERPL-CCNC: 20 U/L (ref 2–50)
ANION GAP SERPL CALC-SCNC: 7 MMOL/L (ref 0–11.9)
AST SERPL-CCNC: 26 U/L (ref 12–45)
BILIRUB SERPL-MCNC: 0.2 MG/DL (ref 0.1–1.5)
BUN SERPL-MCNC: 41 MG/DL (ref 8–22)
CALCIUM SERPL-MCNC: 7.7 MG/DL (ref 8.5–10.5)
CHLORIDE SERPL-SCNC: 93 MMOL/L (ref 96–112)
CO2 SERPL-SCNC: 23 MMOL/L (ref 20–33)
CREAT SERPL-MCNC: 1.84 MG/DL (ref 0.5–1.4)
GFR SERPL CREATININE-BSD FRML MDRD: 27 ML/MIN/1.73 M 2
GLOBULIN SER CALC-MCNC: 2.9 G/DL (ref 1.9–3.5)
GLUCOSE SERPL-MCNC: 155 MG/DL (ref 65–99)
POTASSIUM SERPL-SCNC: 5 MMOL/L (ref 3.6–5.5)
PROT SERPL-MCNC: 5.9 G/DL (ref 6–8.2)
SODIUM SERPL-SCNC: 123 MMOL/L (ref 135–145)

## 2017-03-10 PROCEDURE — 94760 N-INVAS EAR/PLS OXIMETRY 1: CPT

## 2017-03-10 PROCEDURE — 700102 HCHG RX REV CODE 250 W/ 637 OVERRIDE(OP): Performed by: INTERNAL MEDICINE

## 2017-03-10 PROCEDURE — 700111 HCHG RX REV CODE 636 W/ 250 OVERRIDE (IP): Performed by: INTERNAL MEDICINE

## 2017-03-10 PROCEDURE — 700101 HCHG RX REV CODE 250: Performed by: INTERNAL MEDICINE

## 2017-03-10 PROCEDURE — A9270 NON-COVERED ITEM OR SERVICE: HCPCS | Performed by: INTERNAL MEDICINE

## 2017-03-10 PROCEDURE — 700105 HCHG RX REV CODE 258: Performed by: INTERNAL MEDICINE

## 2017-03-10 PROCEDURE — 80053 COMPREHEN METABOLIC PANEL: CPT

## 2017-03-10 PROCEDURE — 94640 AIRWAY INHALATION TREATMENT: CPT

## 2017-03-10 PROCEDURE — 770020 HCHG ROOM/CARE - TELE (206)

## 2017-03-10 PROCEDURE — 99291 CRITICAL CARE FIRST HOUR: CPT | Performed by: INTERNAL MEDICINE

## 2017-03-10 PROCEDURE — 36415 COLL VENOUS BLD VENIPUNCTURE: CPT

## 2017-03-10 RX ORDER — IPRATROPIUM BROMIDE AND ALBUTEROL SULFATE 2.5; .5 MG/3ML; MG/3ML
3 SOLUTION RESPIRATORY (INHALATION)
Status: DISCONTINUED | OUTPATIENT
Start: 2017-03-10 | End: 2017-03-11

## 2017-03-10 RX ORDER — SODIUM CHLORIDE 9 MG/ML
INJECTION, SOLUTION INTRAVENOUS CONTINUOUS
Status: DISCONTINUED | OUTPATIENT
Start: 2017-03-10 | End: 2017-03-11

## 2017-03-10 RX ADMIN — LEVOTHYROXINE SODIUM 50 MCG: 50 TABLET ORAL at 06:04

## 2017-03-10 RX ADMIN — HEPARIN SODIUM 5000 UNITS: 5000 INJECTION, SOLUTION INTRAVENOUS; SUBCUTANEOUS at 06:04

## 2017-03-10 RX ADMIN — CEFTRIAXONE 2 G: 2 INJECTION, POWDER, FOR SOLUTION INTRAMUSCULAR; INTRAVENOUS at 23:24

## 2017-03-10 RX ADMIN — IPRATROPIUM BROMIDE AND ALBUTEROL SULFATE 3 ML: .5; 3 SOLUTION RESPIRATORY (INHALATION) at 19:15

## 2017-03-10 RX ADMIN — IPRATROPIUM BROMIDE AND ALBUTEROL SULFATE 3 ML: .5; 3 SOLUTION RESPIRATORY (INHALATION) at 14:16

## 2017-03-10 RX ADMIN — METHYLPREDNISOLONE SODIUM SUCCINATE 62.5 MG: 125 INJECTION, POWDER, FOR SOLUTION INTRAMUSCULAR; INTRAVENOUS at 23:24

## 2017-03-10 RX ADMIN — SIMVASTATIN 40 MG: 40 TABLET, FILM COATED ORAL at 23:25

## 2017-03-10 RX ADMIN — OMEPRAZOLE 20 MG: 20 CAPSULE, DELAYED RELEASE ORAL at 08:45

## 2017-03-10 RX ADMIN — SERTRALINE 100 MG: 100 TABLET, FILM COATED ORAL at 23:24

## 2017-03-10 RX ADMIN — ASPIRIN 81 MG: 81 TABLET ORAL at 08:45

## 2017-03-10 RX ADMIN — HYDRALAZINE HYDROCHLORIDE 50 MG: 50 TABLET ORAL at 23:25

## 2017-03-10 RX ADMIN — FUROSEMIDE 20 MG: 10 INJECTION, SOLUTION INTRAVENOUS at 06:04

## 2017-03-10 RX ADMIN — HEPARIN SODIUM 5000 UNITS: 5000 INJECTION, SOLUTION INTRAVENOUS; SUBCUTANEOUS at 23:26

## 2017-03-10 RX ADMIN — METHYLPREDNISOLONE SODIUM SUCCINATE 62.5 MG: 125 INJECTION, POWDER, FOR SOLUTION INTRAMUSCULAR; INTRAVENOUS at 00:10

## 2017-03-10 RX ADMIN — METHYLPREDNISOLONE SODIUM SUCCINATE 62.5 MG: 125 INJECTION, POWDER, FOR SOLUTION INTRAMUSCULAR; INTRAVENOUS at 12:08

## 2017-03-10 RX ADMIN — DILTIAZEM HYDROCHLORIDE 180 MG: 180 CAPSULE, COATED, EXTENDED RELEASE ORAL at 08:45

## 2017-03-10 RX ADMIN — METHYLPREDNISOLONE SODIUM SUCCINATE 62.5 MG: 125 INJECTION, POWDER, FOR SOLUTION INTRAMUSCULAR; INTRAVENOUS at 06:04

## 2017-03-10 RX ADMIN — TIOTROPIUM BROMIDE 1 CAPSULE: 18 CAPSULE ORAL; RESPIRATORY (INHALATION) at 06:43

## 2017-03-10 RX ADMIN — SODIUM CHLORIDE: 9 INJECTION, SOLUTION INTRAVENOUS at 14:38

## 2017-03-10 RX ADMIN — IPRATROPIUM BROMIDE AND ALBUTEROL SULFATE 3 ML: .5; 3 SOLUTION RESPIRATORY (INHALATION) at 06:42

## 2017-03-10 RX ADMIN — METHYLPREDNISOLONE SODIUM SUCCINATE 62.5 MG: 125 INJECTION, POWDER, FOR SOLUTION INTRAMUSCULAR; INTRAVENOUS at 17:39

## 2017-03-10 RX ADMIN — DILTIAZEM HYDROCHLORIDE 180 MG: 180 CAPSULE, COATED, EXTENDED RELEASE ORAL at 23:25

## 2017-03-10 RX ADMIN — TRAZODONE HYDROCHLORIDE 50 MG: 50 TABLET ORAL at 23:25

## 2017-03-10 RX ADMIN — OMEPRAZOLE 20 MG: 20 CAPSULE, DELAYED RELEASE ORAL at 23:25

## 2017-03-10 RX ADMIN — BUDESONIDE AND FORMOTEROL FUMARATE DIHYDRATE 2 PUFF: 160; 4.5 AEROSOL RESPIRATORY (INHALATION) at 19:15

## 2017-03-10 RX ADMIN — ONDANSETRON 4 MG: 2 INJECTION, SOLUTION INTRAMUSCULAR; INTRAVENOUS at 17:38

## 2017-03-10 RX ADMIN — HYDRALAZINE HYDROCHLORIDE 50 MG: 50 TABLET ORAL at 08:45

## 2017-03-10 RX ADMIN — HEPARIN SODIUM 5000 UNITS: 5000 INJECTION, SOLUTION INTRAVENOUS; SUBCUTANEOUS at 14:38

## 2017-03-10 RX ADMIN — BUDESONIDE AND FORMOTEROL FUMARATE DIHYDRATE 2 PUFF: 160; 4.5 AEROSOL RESPIRATORY (INHALATION) at 06:42

## 2017-03-10 ASSESSMENT — PAIN SCALES - GENERAL
PAINLEVEL_OUTOF10: 0

## 2017-03-10 ASSESSMENT — ENCOUNTER SYMPTOMS
ABDOMINAL PAIN: 0
PSYCHIATRIC NEGATIVE: 1
SPUTUM PRODUCTION: 1
DIARRHEA: 0
EYES NEGATIVE: 1
SHORTNESS OF BREATH: 1
PALPITATIONS: 0
MUSCULOSKELETAL NEGATIVE: 1
VOMITING: 0
NAUSEA: 0
FEVER: 0
ORTHOPNEA: 0
HEARTBURN: 0
CHILLS: 0
NEUROLOGICAL NEGATIVE: 1
COUGH: 1
WHEEZING: 1

## 2017-03-10 ASSESSMENT — LIFESTYLE VARIABLES: EVER_SMOKED: YES

## 2017-03-10 NOTE — FLOWSHEET NOTE
03/09/17 2012   Events/Summary/Plan   Non-Invasive Resp Device Site Inspection Completed Intact   Interdisciplinary Plan of Care-Goals (Indications)   Obstructive Ventilatory Defect or Pulmonary Disease without Obvious Obstruction History / Diagnosis   Interdisciplinary Plan of Care-Outcomes    Bronchodilator Outcome Patient at Stable Baseline   Education   Education Yes - Pt. / Family has been Instructed in use of Respiratory Equipment   RT Assessment of Delivered Medications   Evaluation of Medication Delivery Daily Yes-- Pt /Family has been Instructed in use of Respiratory Medications and Adverse Reactions   SVN Group   #SVN Performed Yes   Given By: Mouthpiece   MDI/DPI Group   #MDI/DPI Given MDI/DPI x 1   Respiratory WDL   Respiratory (WDL) X   Chest Exam   Respiration 16   Pulse 75   Breath Sounds   Pre/Post Intervention Pre Intervention Assessment   RUL Breath Sounds Diminished   RML Breath Sounds Diminished   RLL Breath Sounds Diminished   JOSE ALEJANDRO Breath Sounds Diminished   LLL Breath Sounds Fine Crackles   Oximetry   #Pulse Oximetry (Single Determination) Yes   Oxygen   Pulse Oximetry 93 %   O2 (LPM) 6   O2 Daily Delivery Respiratory  OxyMask

## 2017-03-10 NOTE — PROGRESS NOTES
Assumed pt care and received report. Pt is AAO4, has no c/o pain or distress. Pt updated on POC, call light within reach, bed in lowest position.

## 2017-03-10 NOTE — PROGRESS NOTES
Hospital Medicine Progress Note, Adult, Complex               Author: Glenn Loo Date & Time created: 3/10/2017  7:46 AM     CC: SOB    Interval History:  75 y/o F with PMH of HTN, COPD, CKD, hypothyroid: admitted for above    Feeling better than yesterday. SOB is improving.      Review of Systems:  Review of Systems   Constitutional: Positive for malaise/fatigue. Negative for fever and chills.   HENT: Negative.    Eyes: Negative.    Respiratory: Positive for cough, sputum production, shortness of breath and wheezing.    Cardiovascular: Negative for chest pain, palpitations and orthopnea.   Gastrointestinal: Negative for heartburn, nausea, vomiting, abdominal pain and diarrhea.   Genitourinary: Negative.    Musculoskeletal: Negative.    Neurological: Negative.    Psychiatric/Behavioral: Negative.        Physical Exam:  Physical Exam   Constitutional: She is oriented to person, place, and time. No distress.   HENT:   Head: Normocephalic and atraumatic.   Mouth/Throat: Oropharynx is clear and moist.   Eyes: Conjunctivae and EOM are normal. Pupils are equal, round, and reactive to light. Right eye exhibits no discharge.   Neck: Normal range of motion. Neck supple. No thyromegaly present.   Cardiovascular: Normal rate and regular rhythm.    No murmur heard.  Pulmonary/Chest: Effort normal. No respiratory distress. She has wheezes.   Abdominal: Soft. Bowel sounds are normal. She exhibits no distension. There is no tenderness.   Musculoskeletal: She exhibits no edema or tenderness.   Neurological: She is alert and oriented to person, place, and time. No cranial nerve deficit.   Skin: Skin is warm and dry. She is not diaphoretic. No erythema.   Psychiatric: She has a normal mood and affect. Her behavior is normal. Thought content normal.       Labs:        Invalid input(s): [id number]  Recent Labs      03/08/17   1730  03/09/17   0145  03/09/17   0737   TROPONINI  0.05*  0.03  0.02   BNPBTYPENAT  227*   --    --       Recent Labs      03/08/17   1910  03/09/17   0145  03/10/17   0234   SODIUM  135  129*  123*   POTASSIUM  4.6  5.1  5.0   CHLORIDE  102  98  93*   CO2  28  25  23   BUN  23*  26*  41*   CREATININE  1.07  1.28  1.84*   CALCIUM  8.4*  8.0*  7.7*     Recent Labs      03/08/17   1910  03/09/17   0145  03/10/17   0234   ALTSGPT   --    --   20   ASTSGOT   --    --   26   ALKPHOSPHAT   --    --   123*   TBILIRUBIN   --    --   0.2   GLUCOSE  122*  188*  155*     Recent Labs      17   RBC  5.32  5.07   HEMOGLOBIN  16.5*  15.4   HEMATOCRIT  50.3*  49.3*   PLATELETCT  276  237     Recent Labs      03/08/17   1730  03/09/17   0145  03/10/17   0234   WBC  9.3  9.0   --    NEUTSPOLYS  71.10   --    --    LYMPHOCYTES  18.10*   --    --    MONOCYTES  8.90   --    --    EOSINOPHILS  1.00   --    --    BASOPHILS  0.50   --    --    ASTSGOT   --    --   26   ALTSGPT   --    --   20   ALKPHOSPHAT   --    --   123*   TBILIRUBIN   --    --   0.2           Hemodynamics:  Temp (24hrs), Av.4 °C (97.6 °F), Min:35.9 °C (96.7 °F), Max:37 °C (98.6 °F)  Temperature: 35.9 °C (96.7 °F)  Pulse  Av.2  Min: 64  Max: 81   Blood Pressure : 131/66 mmHg     Respiratory:    Respiration: 18, Pulse Oximetry: 93 %, O2 Daily Delivery Respiratory : OxyMask     Given By:: Mouthpiece, #MDI/DPI Given: MDI/DPI x 2, Work Of Breathing / Effort: Mild  RUL Breath Sounds: Clear, RML Breath Sounds: Diminished, RLL Breath Sounds: Diminished, JOSE ALEJANDRO Breath Sounds: Clear, LLL Breath Sounds: Diminished  Fluids:  No intake or output data in the 24 hours ending 03/10/17 0746  Weight: 75.3 kg (166 lb 0.1 oz)  GI/Nutrition:  Orders Placed This Encounter   Procedures   • Diet Order     Standing Status: Standing      Number of Occurrences: 1      Standing Expiration Date:      Order Specific Question:  Diet:     Answer:  Regular [1]     Echo: 3/9  Hyperdynamic left ventricular systolic function.  Left ventricular ejection fraction is  visually estimated to be greater   than 70%.  Mild concentric left ventricular hypertrophy.  Grade I diastolic dysfunction.  Enlarged right atrium.  Mildly dilated right ventricle.  Trace mitral regurgitation.  Mild aortic stenosis.  Transvalvular gradients are - Peak: 22 mmHg, Mean: 10 mmHg.  This may be, in part,artifactual due to hyperdynamic LV contractility.  Right ventricular systolic pressure is estimated to be 65 mmHg.  Moderate tricuspid regurgitation.    Medical Decision Making, by Problem:  Active Hospital Problems    Diagnosis   • Acute respiratory failure (CMS-[facility]) [J96.00]  - from COPD exacerbation and CAP  - on IV abx with ceftriaxone, azithromycin  - on aggressive breathing treatment, duoneb, steroid, albuterol  - initially on 10L--5L of o2 now  - titrate as needed     • Depression [F32.9]     • Polycythemia [D75.1]  - secondary to COPD     • Hyponatremia [E87.1]  - worsening  - follow cmp in am  - on IVF     • Hyperglycemia [R73.9]  - a1c: 6.1     • Elevated brain natriuretic peptide (BNP) level [R79.89]  - echo: LVEF: 70%, GI diastolic dysfunction  - monitor closely for fluid status     • Elevated troponin [R79.89]  - likely demand ischemia     • COPD exacerbation (CMS-HCC) [J44.1]  - plan as above     • [place]  - Cr: worsening  - will hold lasix, ibersatan  - avoid nephrotoxic drugs  - follow cmp in am  - started on IVF  - check UA     • HTN (hypertension) [I10]     • Hypothyroid [E03.9]     • Chronic pain, neck/narcotics [G89.29]   Patient is critically ill with acute hypoxic respiratory failure, [place]  The vital organ system that is affected is pulmonary, kidneys.  If untreated there is a high chance of deterioration into respiratory failure, kidney failure and eventually death.   The critical care that I am providing today is management with IV abx, breathing treatment, IVF  The critical that has been undertaken is medically complex.   There has been no overlap in critical care time.   Critical Care  Time not including procedures: 40 minutes    Labs reviewed, Radiology images reviewed and Medications reviewed        DVT Prophylaxis: Heparin      Antibiotics: Treating active infection/contamination beyond 24 hours perioperative coverage

## 2017-03-10 NOTE — PROGRESS NOTES
Bedside report received by Day TRANG Laboy. Patient lying in bed watching TV at this time. POC discussed, verbalized understanding. No immediate concerns for patient at this time. All safety measures in place.

## 2017-03-10 NOTE — PROGRESS NOTES
Report received from TRANG Bobo. Assumed care of patient. Updated patient on plan of care. Fall precautions in place, call light within reach.

## 2017-03-10 NOTE — FLOWSHEET NOTE
03/09/17 1305   Interdisciplinary Plan of Care-Goals (Indications)   Obstructive Ventilatory Defect or Pulmonary Disease without Obvious Obstruction History / Diagnosis   Interdisciplinary Plan of Care-Outcomes    Bronchodilator Outcome Patient at Stable Baseline   Education   Education Yes - Pt. / Family has been Instructed in use of Respiratory Equipment   RT Assessment of Delivered Medications   Evaluation of Medication Delivery Daily Yes-- Pt /Family has been Instructed in use of Respiratory Medications and Adverse Reactions   SVN Group   #SVN Performed Yes   Given By: Mouthpiece   Respiratory WDL   Respiratory (WDL) X   Chest Exam   Work Of Breathing / Effort Mild   Respiration 16   Pulse 78   Breath Sounds   Pre/Post Intervention Pre Intervention Assessment   RUL Breath Sounds Clear   RML Breath Sounds Diminished   RLL Breath Sounds Diminished   JOSE ALEJANDRO Breath Sounds Clear   LLL Breath Sounds Diminished   Oximetry   #Pulse Oximetry (Single Determination) Yes   Oxygen   Pulse Oximetry 93 %   O2 (LPM) 6   O2 Daily Delivery Respiratory  OxyMask

## 2017-03-11 LAB
ALBUMIN SERPL BCP-MCNC: 2.6 G/DL (ref 3.2–4.9)
ALBUMIN/GLOB SERPL: 1 G/DL
ALP SERPL-CCNC: 99 U/L (ref 30–99)
ALT SERPL-CCNC: 18 U/L (ref 2–50)
ANION GAP SERPL CALC-SCNC: 9 MMOL/L (ref 0–11.9)
AST SERPL-CCNC: 23 U/L (ref 12–45)
BILIRUB SERPL-MCNC: 0.2 MG/DL (ref 0.1–1.5)
BUN SERPL-MCNC: 45 MG/DL (ref 8–22)
CALCIUM SERPL-MCNC: 7.5 MG/DL (ref 8.5–10.5)
CHLORIDE SERPL-SCNC: 91 MMOL/L (ref 96–112)
CO2 SERPL-SCNC: 20 MMOL/L (ref 20–33)
CREAT SERPL-MCNC: 1.53 MG/DL (ref 0.5–1.4)
EKG IMPRESSION: NORMAL
GFR SERPL CREATININE-BSD FRML MDRD: 33 ML/MIN/1.73 M 2
GLOBULIN SER CALC-MCNC: 2.7 G/DL (ref 1.9–3.5)
GLUCOSE SERPL-MCNC: 196 MG/DL (ref 65–99)
OSMOLALITY SERPL: 272 MOSM/KG H2O (ref 278–298)
POTASSIUM SERPL-SCNC: 5.3 MMOL/L (ref 3.6–5.5)
PROT SERPL-MCNC: 5.3 G/DL (ref 6–8.2)
SODIUM SERPL-SCNC: 120 MMOL/L (ref 135–145)

## 2017-03-11 PROCEDURE — 700105 HCHG RX REV CODE 258: Performed by: INTERNAL MEDICINE

## 2017-03-11 PROCEDURE — 83930 ASSAY OF BLOOD OSMOLALITY: CPT

## 2017-03-11 PROCEDURE — 700101 HCHG RX REV CODE 250: Performed by: INTERNAL MEDICINE

## 2017-03-11 PROCEDURE — 700102 HCHG RX REV CODE 250 W/ 637 OVERRIDE(OP): Performed by: INTERNAL MEDICINE

## 2017-03-11 PROCEDURE — 94760 N-INVAS EAR/PLS OXIMETRY 1: CPT

## 2017-03-11 PROCEDURE — 80053 COMPREHEN METABOLIC PANEL: CPT

## 2017-03-11 PROCEDURE — 94640 AIRWAY INHALATION TREATMENT: CPT

## 2017-03-11 PROCEDURE — A9270 NON-COVERED ITEM OR SERVICE: HCPCS | Performed by: INTERNAL MEDICINE

## 2017-03-11 PROCEDURE — 770020 HCHG ROOM/CARE - TELE (206)

## 2017-03-11 PROCEDURE — 700111 HCHG RX REV CODE 636 W/ 250 OVERRIDE (IP): Performed by: INTERNAL MEDICINE

## 2017-03-11 PROCEDURE — 99233 SBSQ HOSP IP/OBS HIGH 50: CPT | Performed by: INTERNAL MEDICINE

## 2017-03-11 PROCEDURE — 36415 COLL VENOUS BLD VENIPUNCTURE: CPT

## 2017-03-11 RX ORDER — PREDNISONE 20 MG/1
40 TABLET ORAL DAILY
Status: DISCONTINUED | OUTPATIENT
Start: 2017-03-11 | End: 2017-03-17 | Stop reason: HOSPADM

## 2017-03-11 RX ORDER — TIOTROPIUM BROMIDE 18 UG/1
1 CAPSULE ORAL; RESPIRATORY (INHALATION) DAILY
Status: DISCONTINUED | OUTPATIENT
Start: 2017-03-12 | End: 2017-03-17 | Stop reason: HOSPADM

## 2017-03-11 RX ORDER — ALBUTEROL SULFATE 90 UG/1
2 AEROSOL, METERED RESPIRATORY (INHALATION) EVERY 4 HOURS PRN
Status: DISCONTINUED | OUTPATIENT
Start: 2017-03-11 | End: 2017-03-17 | Stop reason: HOSPADM

## 2017-03-11 RX ORDER — BUDESONIDE AND FORMOTEROL FUMARATE DIHYDRATE 160; 4.5 UG/1; UG/1
2 AEROSOL RESPIRATORY (INHALATION) 2 TIMES DAILY
Status: DISCONTINUED | OUTPATIENT
Start: 2017-03-11 | End: 2017-03-17 | Stop reason: HOSPADM

## 2017-03-11 RX ORDER — IPRATROPIUM BROMIDE AND ALBUTEROL SULFATE 2.5; .5 MG/3ML; MG/3ML
3 SOLUTION RESPIRATORY (INHALATION)
Status: DISCONTINUED | OUTPATIENT
Start: 2017-03-11 | End: 2017-03-17 | Stop reason: HOSPADM

## 2017-03-11 RX ADMIN — DILTIAZEM HYDROCHLORIDE 180 MG: 180 CAPSULE, COATED, EXTENDED RELEASE ORAL at 09:57

## 2017-03-11 RX ADMIN — HYDRALAZINE HYDROCHLORIDE 50 MG: 50 TABLET ORAL at 21:33

## 2017-03-11 RX ADMIN — HEPARIN SODIUM 5000 UNITS: 5000 INJECTION, SOLUTION INTRAVENOUS; SUBCUTANEOUS at 05:35

## 2017-03-11 RX ADMIN — TRAZODONE HYDROCHLORIDE 50 MG: 50 TABLET ORAL at 21:34

## 2017-03-11 RX ADMIN — OMEPRAZOLE 20 MG: 20 CAPSULE, DELAYED RELEASE ORAL at 09:56

## 2017-03-11 RX ADMIN — CEFTRIAXONE 2 G: 2 INJECTION, POWDER, FOR SOLUTION INTRAMUSCULAR; INTRAVENOUS at 21:33

## 2017-03-11 RX ADMIN — AZITHROMYCIN 500 MG: 500 INJECTION, POWDER, LYOPHILIZED, FOR SOLUTION INTRAVENOUS at 23:04

## 2017-03-11 RX ADMIN — IPRATROPIUM BROMIDE AND ALBUTEROL SULFATE 3 ML: .5; 3 SOLUTION RESPIRATORY (INHALATION) at 06:59

## 2017-03-11 RX ADMIN — AZITHROMYCIN 500 MG: 500 INJECTION, POWDER, LYOPHILIZED, FOR SOLUTION INTRAVENOUS at 01:25

## 2017-03-11 RX ADMIN — LEVOTHYROXINE SODIUM 50 MCG: 50 TABLET ORAL at 05:35

## 2017-03-11 RX ADMIN — METHYLPREDNISOLONE SODIUM SUCCINATE 62.5 MG: 125 INJECTION, POWDER, FOR SOLUTION INTRAMUSCULAR; INTRAVENOUS at 13:16

## 2017-03-11 RX ADMIN — BUDESONIDE AND FORMOTEROL FUMARATE DIHYDRATE 2 PUFF: 160; 4.5 AEROSOL RESPIRATORY (INHALATION) at 21:31

## 2017-03-11 RX ADMIN — OMEPRAZOLE 20 MG: 20 CAPSULE, DELAYED RELEASE ORAL at 21:34

## 2017-03-11 RX ADMIN — SIMVASTATIN 40 MG: 40 TABLET, FILM COATED ORAL at 21:34

## 2017-03-11 RX ADMIN — TIOTROPIUM BROMIDE 1 CAPSULE: 18 CAPSULE ORAL; RESPIRATORY (INHALATION) at 07:03

## 2017-03-11 RX ADMIN — PREDNISONE 40 MG: 20 TABLET ORAL at 18:44

## 2017-03-11 RX ADMIN — SERTRALINE 100 MG: 100 TABLET, FILM COATED ORAL at 21:34

## 2017-03-11 RX ADMIN — HEPARIN SODIUM 5000 UNITS: 5000 INJECTION, SOLUTION INTRAVENOUS; SUBCUTANEOUS at 13:15

## 2017-03-11 RX ADMIN — ASPIRIN 81 MG: 81 TABLET ORAL at 09:57

## 2017-03-11 RX ADMIN — DILTIAZEM HYDROCHLORIDE 180 MG: 180 CAPSULE, COATED, EXTENDED RELEASE ORAL at 21:34

## 2017-03-11 RX ADMIN — HYDRALAZINE HYDROCHLORIDE 50 MG: 50 TABLET ORAL at 09:57

## 2017-03-11 RX ADMIN — SODIUM CHLORIDE: 9 INJECTION, SOLUTION INTRAVENOUS at 05:35

## 2017-03-11 RX ADMIN — METHYLPREDNISOLONE SODIUM SUCCINATE 62.5 MG: 125 INJECTION, POWDER, FOR SOLUTION INTRAMUSCULAR; INTRAVENOUS at 05:35

## 2017-03-11 RX ADMIN — BUDESONIDE AND FORMOTEROL FUMARATE DIHYDRATE 2 PUFF: 160; 4.5 AEROSOL RESPIRATORY (INHALATION) at 07:03

## 2017-03-11 RX ADMIN — ALPRAZOLAM 1 MG: 0.5 TABLET ORAL at 10:12

## 2017-03-11 ASSESSMENT — ENCOUNTER SYMPTOMS
COUGH: 1
SHORTNESS OF BREATH: 1
EYES NEGATIVE: 1
WHEEZING: 1
HEARTBURN: 0
ORTHOPNEA: 0
CHILLS: 0
PALPITATIONS: 0
MUSCULOSKELETAL NEGATIVE: 1
NEUROLOGICAL NEGATIVE: 1
VOMITING: 0
NAUSEA: 0
FEVER: 0
SPUTUM PRODUCTION: 1
PSYCHIATRIC NEGATIVE: 1

## 2017-03-11 ASSESSMENT — PAIN SCALES - GENERAL
PAINLEVEL_OUTOF10: 0

## 2017-03-11 NOTE — CARE PLAN
Problem: Venous Thromboembolism (VTW)/Deep Vein Thrombosis (DVT) Prevention:  Goal: Patient will participate in Venous Thrombosis (VTE)/Deep Vein Thrombosis (DVT)Prevention Measures  Outcome: PROGRESSING AS EXPECTED  Pt receiving unfractionated heparin for VTE prophylaxis. Pt up ambulating with steady gait. Pt educated and verbalized understanding of indication for medication.     Problem: Knowledge Deficit  Goal: Knowledge of disease process/condition, treatment plan, diagnostic tests, and medications will improve  Outcome: PROGRESSING AS EXPECTED  Discussed POC with pt. Answered all questions appropriately. White board updated. All medications and interventions explained before performed. Pt verbalized understanding.

## 2017-03-11 NOTE — CARE PLAN
Problem: Communication  Goal: The ability to communicate needs accurately and effectively will improve  Outcome: PROGRESSING AS EXPECTED    Problem: Safety  Goal: Will remain free from falls  Outcome: PROGRESSING AS EXPECTED

## 2017-03-11 NOTE — FLOWSHEET NOTE
03/11/17 0704   Events/Summary/Plan   Events/Summary/Plan TX/ MDI done   Interdisciplinary Plan of Care-Goals (Indications)   Obstructive Ventilatory Defect or Pulmonary Disease without Obvious Obstruction History / Diagnosis   Interdisciplinary Plan of Care-Outcomes    Bronchodilator Outcome Patient at Stable Baseline   Education   Education Yes - Pt. / Family has been Instructed in use of Respiratory Equipment   RT Assessment of Delivered Medications   Evaluation of Medication Delivery Daily Yes-- Pt /Family has been Instructed in use of Respiratory Medications and Adverse Reactions   SVN Group   #SVN Performed Yes   Given By: Mouthpiece   MDI/DPI Group   #MDI/DPI Given MDI/DPI x 2   Respiratory WDL   Respiratory (WDL) X   Chest Exam   Work Of Breathing / Effort Mild   Respiration 16   Pulse 72   Breath Sounds   Pre/Post Intervention Pre Intervention Assessment   RUL Breath Sounds Diminished   RML Breath Sounds Diminished   RLL Breath Sounds Diminished   JOSE ALEJANDRO Breath Sounds Diminished   LLL Breath Sounds Diminished   Secretions   Cough Moist;Non Productive;Strong   How Sputum Obtained Cough on Request   Sputum Amount Unable to Evaluate   Oximetry   #Pulse Oximetry (Single Determination) Yes   Oxygen   Pulse Oximetry 90 %   O2 (LPM) 4   O2 Daily Delivery Respiratory  Silicone Nasal Cannula

## 2017-03-11 NOTE — PROGRESS NOTES
Assumed care of patient. Bedside report received from TRANG Amos. Updated on POC, call light within reach and fall precautions in place. Bed locked and in lowest position. Patient instructed to call for assistance before getting out of bed. All questions answered, no other needs at this time. MAR, labs, orders reviewed.

## 2017-03-11 NOTE — PROGRESS NOTES
Hospital Medicine Progress Note, Adult, Complex               Author: Glenn Loo Date & Time created: 3/11/2017  8:17 AM     CC: SOB    Interval History:  77 y/o F with PMH of HTN, COPD, CKD, hypothyroid: admitted for above    Feeling better each day. Denies n/v/d.       Review of Systems:  Review of Systems   Constitutional: Positive for malaise/fatigue. Negative for fever and chills.   HENT: Negative.    Eyes: Negative.    Respiratory: Positive for cough, sputum production, shortness of breath and wheezing.    Cardiovascular: Negative for chest pain, palpitations and orthopnea.   Gastrointestinal: Negative for heartburn, nausea and vomiting.   Genitourinary: Negative.    Musculoskeletal: Negative.    Neurological: Negative.    Psychiatric/Behavioral: Negative.        Physical Exam:  Physical Exam   Constitutional: She is oriented to person, place, and time. No distress.   HENT:   Head: Normocephalic and atraumatic.   Mouth/Throat: Oropharynx is clear and moist.   Eyes: Conjunctivae are normal. Pupils are equal, round, and reactive to light. Right eye exhibits no discharge. Left eye exhibits no discharge.   Neck: Normal range of motion. Neck supple.   Cardiovascular: Normal rate and regular rhythm.    No murmur heard.  Pulmonary/Chest: Effort normal. No respiratory distress. She has wheezes.   Abdominal: Soft. Bowel sounds are normal. She exhibits no distension.   Musculoskeletal: She exhibits no edema or tenderness.   Neurological: She is alert and oriented to person, place, and time. No cranial nerve deficit.   Skin: Skin is warm and dry. She is not diaphoretic. No erythema.   Psychiatric: She has a normal mood and affect. Her behavior is normal. Thought content normal.       Labs:        Invalid input(s): [id number]  Recent Labs      03/08/17   1730  03/09/17   0145  03/09/17   0737   TROPONINI  0.05*  0.03  0.02   BNPBTYPENAT  227*   --    --      Recent Labs      03/09/17   0145  03/10/17   0234   17   SODIUM  129*  123*  120*   POTASSIUM  5.1  5.0  5.3   CHLORIDE  98  93*  91*   CO2  25  23  20   BUN  26*  41*  45*   CREATININE  1.28  1.84*  1.53*   CALCIUM  8.0*  7.7*  7.5*     Recent Labs      03/09/17   0145  03/10/17   0234  03/11/17   0221   ALTSGPT   --   20  18   ASTSGOT   --   26  23   ALKPHOSPHAT   --   123*  99   TBILIRUBIN   --   0.2  0.2   GLUCOSE  188*  155*  196*     Recent Labs      17   RBC  5.32  5.07   HEMOGLOBIN  16.5*  15.4   HEMATOCRIT  50.3*  49.3*   PLATELETCT  276  237     Recent Labs      03/08/17   1730  03/09/17   0145  03/10/17   0234  03/11/17   0221   WBC  9.3  9.0   --    --    NEUTSPOLYS  71.10   --    --    --    LYMPHOCYTES  18.10*   --    --    --    MONOCYTES  8.90   --    --    --    EOSINOPHILS  1.00   --    --    --    BASOPHILS  0.50   --    --    --    ASTSGOT   --    --   26  23   ALTSGPT   --    --   20  18   ALKPHOSPHAT   --    --   123*  99   TBILIRUBIN   --    --   0.2  0.2           Hemodynamics:  Temp (24hrs), Av.9 °C (98.4 °F), Min:36.3 °C (97.4 °F), Max:37.3 °C (99.2 °F)  Temperature: 37.1 °C (98.8 °F)  Pulse  Av.8  Min: 64  Max: 81   Blood Pressure : 104/45 mmHg     Respiratory:    Respiration: 16, Pulse Oximetry: 90 %, O2 Daily Delivery Respiratory : Silicone Nasal Cannula     Given By:: Mouthpiece, #MDI/DPI Given: MDI/DPI x 2, Work Of Breathing / Effort: Mild  RUL Breath Sounds: Diminished, RML Breath Sounds: Diminished, RLL Breath Sounds: Diminished, JOSE ALEJANDRO Breath Sounds: Diminished, LLL Breath Sounds: Diminished  Fluids:    Intake/Output Summary (Last 24 hours) at 17 0817  Last data filed at 03/10/17 2230   Gross per 24 hour   Intake    640 ml   Output      0 ml   Net    640 ml     Weight: 78.4 kg (172 lb 13.5 oz)  GI/Nutrition:  Orders Placed This Encounter   Procedures   • Diet Order     Standing Status: Standing      Number of Occurrences: 1      Standing Expiration Date:      Order Specific  Question:  Diet:     Answer:  Regular [1]     Echo: 3/9  Hyperdynamic left ventricular systolic function.  Left ventricular ejection fraction is visually estimated to be greater   than 70%.  Mild concentric left ventricular hypertrophy.  Grade I diastolic dysfunction.  Enlarged right atrium.  Mildly dilated right ventricle.  Trace mitral regurgitation.  Mild aortic stenosis.  Transvalvular gradients are - Peak: 22 mmHg, Mean: 10 mmHg.  This may be, in part,artifactual due to hyperdynamic LV contractility.  Right ventricular systolic pressure is estimated to be 65 mmHg.  Moderate tricuspid regurgitation.    Medical Decision Making, by Problem:  Active Hospital Problems    Diagnosis   • Acute respiratory failure (CMS-[facility]) [J96.00]  - from COPD exacerbation and CAP  - on IV abx with ceftriaxone, azithromycin  - on aggressive breathing treatment, duoneb, steroid, albuterol  - initially on 10L--5L--4L of o2 now  - titrate as needed     • Depression [F32.9]     • Polycythemia [D75.1]  - secondary to COPD     • Hyponatremia [E87.1]  - worsening  - follow cmp in am  - on IVF  - will get serum and urine osmolality     • Hyperglycemia [R73.9]  - a1c: 6.1     • Elevated brain natriuretic peptide (BNP) level [R79.89]  - echo: LVEF: 70%, GI diastolic dysfunction  - monitor closely for fluid status     • Elevated troponin [R79.89]  - likely demand ischemia     • COPD exacerbation (CMS-HCC) [J44.1]  - plan as above     • [place]  - Cr: improving  - holding lasix, ibersatan  - avoid nephrotoxic drugs  - follow cmp in am  - started on IVF     • HTN (hypertension) [I10]     • Hypothyroid [E03.9]     • Chronic pain, neck/narcotics [G89.29]     Labs reviewed, Radiology images reviewed and Medications reviewed        DVT Prophylaxis: Heparin      Antibiotics: Treating active infection/contamination beyond 24 hours perioperative coverage

## 2017-03-11 NOTE — PROGRESS NOTES
Assumed care report received. Assessment completed. AOx4. Pt resting in bed, denies any pain at this time. No other complaints at this time. Plan of care discussed, verbalized understanding. Fall precautions in place. Call light within reach. Tele monitor in place. White board updated.

## 2017-03-12 PROBLEM — E87.5 HYPERKALEMIA: Status: ACTIVE | Noted: 2017-03-12

## 2017-03-12 LAB
ALBUMIN SERPL BCP-MCNC: 3 G/DL (ref 3.2–4.9)
ALBUMIN/GLOB SERPL: 1.2 G/DL
ALP SERPL-CCNC: 94 U/L (ref 30–99)
ALT SERPL-CCNC: 22 U/L (ref 2–50)
ANION GAP SERPL CALC-SCNC: 7 MMOL/L (ref 0–11.9)
AST SERPL-CCNC: 24 U/L (ref 12–45)
BILIRUB SERPL-MCNC: 0.2 MG/DL (ref 0.1–1.5)
BUN SERPL-MCNC: 44 MG/DL (ref 8–22)
CALCIUM SERPL-MCNC: 7.8 MG/DL (ref 8.5–10.5)
CHLORIDE SERPL-SCNC: 92 MMOL/L (ref 96–112)
CO2 SERPL-SCNC: 22 MMOL/L (ref 20–33)
CREAT SERPL-MCNC: 1.46 MG/DL (ref 0.5–1.4)
GFR SERPL CREATININE-BSD FRML MDRD: 35 ML/MIN/1.73 M 2
GLOBULIN SER CALC-MCNC: 2.6 G/DL (ref 1.9–3.5)
GLUCOSE SERPL-MCNC: 156 MG/DL (ref 65–99)
OSMOLALITY UR: 254 MOSM/KG H2O (ref 300–900)
POTASSIUM SERPL-SCNC: 5.5 MMOL/L (ref 3.6–5.5)
PROT SERPL-MCNC: 5.6 G/DL (ref 6–8.2)
SODIUM SERPL-SCNC: 121 MMOL/L (ref 135–145)

## 2017-03-12 PROCEDURE — 99233 SBSQ HOSP IP/OBS HIGH 50: CPT | Performed by: INTERNAL MEDICINE

## 2017-03-12 PROCEDURE — A9270 NON-COVERED ITEM OR SERVICE: HCPCS | Performed by: INTERNAL MEDICINE

## 2017-03-12 PROCEDURE — 700111 HCHG RX REV CODE 636 W/ 250 OVERRIDE (IP): Performed by: INTERNAL MEDICINE

## 2017-03-12 PROCEDURE — 700102 HCHG RX REV CODE 250 W/ 637 OVERRIDE(OP): Performed by: INTERNAL MEDICINE

## 2017-03-12 PROCEDURE — 700105 HCHG RX REV CODE 258: Performed by: INTERNAL MEDICINE

## 2017-03-12 PROCEDURE — 80053 COMPREHEN METABOLIC PANEL: CPT

## 2017-03-12 PROCEDURE — 83935 ASSAY OF URINE OSMOLALITY: CPT

## 2017-03-12 PROCEDURE — 36415 COLL VENOUS BLD VENIPUNCTURE: CPT

## 2017-03-12 PROCEDURE — 770020 HCHG ROOM/CARE - TELE (206)

## 2017-03-12 RX ORDER — SODIUM CHLORIDE 1 G/1
2 TABLET ORAL
Status: DISCONTINUED | OUTPATIENT
Start: 2017-03-12 | End: 2017-03-14

## 2017-03-12 RX ORDER — SODIUM CHLORIDE 9 MG/ML
INJECTION, SOLUTION INTRAVENOUS CONTINUOUS
Status: DISCONTINUED | OUTPATIENT
Start: 2017-03-12 | End: 2017-03-13

## 2017-03-12 RX ADMIN — SODIUM CHLORIDE TAB 1 GM 2 G: 1 TAB at 14:24

## 2017-03-12 RX ADMIN — DILTIAZEM HYDROCHLORIDE 180 MG: 180 CAPSULE, COATED, EXTENDED RELEASE ORAL at 21:41

## 2017-03-12 RX ADMIN — BUDESONIDE AND FORMOTEROL FUMARATE DIHYDRATE 2 PUFF: 160; 4.5 AEROSOL RESPIRATORY (INHALATION) at 19:52

## 2017-03-12 RX ADMIN — LORAZEPAM 0.5 MG: 1 TABLET ORAL at 21:51

## 2017-03-12 RX ADMIN — SIMVASTATIN 40 MG: 40 TABLET, FILM COATED ORAL at 19:52

## 2017-03-12 RX ADMIN — HEPARIN SODIUM 5000 UNITS: 5000 INJECTION, SOLUTION INTRAVENOUS; SUBCUTANEOUS at 14:25

## 2017-03-12 RX ADMIN — LEVOTHYROXINE SODIUM 50 MCG: 50 TABLET ORAL at 06:00

## 2017-03-12 RX ADMIN — TRAZODONE HYDROCHLORIDE 50 MG: 50 TABLET ORAL at 21:42

## 2017-03-12 RX ADMIN — SODIUM CHLORIDE TAB 1 GM 2 G: 1 TAB at 17:23

## 2017-03-12 RX ADMIN — Medication 2 TABLET: at 21:54

## 2017-03-12 RX ADMIN — HYDRALAZINE HYDROCHLORIDE 50 MG: 50 TABLET ORAL at 21:41

## 2017-03-12 RX ADMIN — CEFTRIAXONE 2 G: 2 INJECTION, POWDER, FOR SOLUTION INTRAMUSCULAR; INTRAVENOUS at 19:56

## 2017-03-12 RX ADMIN — PREDNISONE 40 MG: 20 TABLET ORAL at 08:33

## 2017-03-12 RX ADMIN — Medication 2 TABLET: at 08:33

## 2017-03-12 RX ADMIN — OMEPRAZOLE 20 MG: 20 CAPSULE, DELAYED RELEASE ORAL at 19:52

## 2017-03-12 RX ADMIN — OMEPRAZOLE 20 MG: 20 CAPSULE, DELAYED RELEASE ORAL at 08:33

## 2017-03-12 RX ADMIN — AZITHROMYCIN 500 MG: 500 INJECTION, POWDER, LYOPHILIZED, FOR SOLUTION INTRAVENOUS at 23:03

## 2017-03-12 RX ADMIN — SERTRALINE 100 MG: 100 TABLET, FILM COATED ORAL at 19:52

## 2017-03-12 RX ADMIN — HEPARIN SODIUM 5000 UNITS: 5000 INJECTION, SOLUTION INTRAVENOUS; SUBCUTANEOUS at 21:42

## 2017-03-12 RX ADMIN — SODIUM CHLORIDE: 9 INJECTION, SOLUTION INTRAVENOUS at 17:40

## 2017-03-12 RX ADMIN — ASPIRIN 81 MG: 81 TABLET ORAL at 08:33

## 2017-03-12 RX ADMIN — HYDRALAZINE HYDROCHLORIDE 50 MG: 50 TABLET ORAL at 08:33

## 2017-03-12 RX ADMIN — SODIUM CHLORIDE: 9 INJECTION, SOLUTION INTRAVENOUS at 08:39

## 2017-03-12 RX ADMIN — DILTIAZEM HYDROCHLORIDE 180 MG: 180 CAPSULE, COATED, EXTENDED RELEASE ORAL at 08:33

## 2017-03-12 ASSESSMENT — ENCOUNTER SYMPTOMS
FEVER: 0
CHILLS: 0
COUGH: 1
NEUROLOGICAL NEGATIVE: 1
PALPITATIONS: 0
ABDOMINAL PAIN: 0
EYES NEGATIVE: 1
HEARTBURN: 0
NAUSEA: 0
PSYCHIATRIC NEGATIVE: 1
SHORTNESS OF BREATH: 1
WHEEZING: 1
MUSCULOSKELETAL NEGATIVE: 1
SPUTUM PRODUCTION: 1
VOMITING: 0
ORTHOPNEA: 0

## 2017-03-12 ASSESSMENT — PAIN SCALES - GENERAL
PAINLEVEL_OUTOF10: 0

## 2017-03-12 NOTE — PROGRESS NOTES
Bedside report received by Day RN Lorena. Patient sitting at edge of bed watching TV and finishing dinner at this time, family present. POC discussed, verbalized understanding. No immediate concerns for patient at this time. All safety measures in place.

## 2017-03-12 NOTE — PROGRESS NOTES
Hospital Medicine Progress Note, Adult, Complex               Author: Glenn Loo Date & Time created: 3/12/2017  8:06 AM     CC: SOB    Interval History:  77 y/o F with PMH of HTN, COPD, CKD, hypothyroid: admitted for above    Feeling better each day. Denies n/v/d. Still on 3L of o2.      Review of Systems:  Review of Systems   Constitutional: Positive for malaise/fatigue. Negative for fever and chills.   HENT: Negative.    Eyes: Negative.    Respiratory: Positive for cough, sputum production, shortness of breath and wheezing.    Cardiovascular: Negative for chest pain, palpitations and orthopnea.   Gastrointestinal: Negative for heartburn, nausea, vomiting and abdominal pain.   Genitourinary: Negative.    Musculoskeletal: Negative.    Neurological: Negative.    Psychiatric/Behavioral: Negative.        Physical Exam:  Physical Exam   Constitutional: She is oriented to person, place, and time. No distress.   HENT:   Head: Normocephalic and atraumatic.   Mouth/Throat: Oropharynx is clear and moist.   Eyes: Conjunctivae are normal. Pupils are equal, round, and reactive to light. Right eye exhibits no discharge. Left eye exhibits no discharge.   Neck: Normal range of motion. Neck supple.   Cardiovascular: Normal rate and regular rhythm.    No murmur heard.  Pulmonary/Chest: Effort normal. No respiratory distress. She has wheezes.   Abdominal: Soft. Bowel sounds are normal. She exhibits no distension. There is no tenderness.   Musculoskeletal: She exhibits no edema or tenderness.   Neurological: She is alert and oriented to person, place, and time. No cranial nerve deficit.   Skin: Skin is warm and dry. She is not diaphoretic. No erythema.   Psychiatric: She has a normal mood and affect. Her behavior is normal. Thought content normal.       Labs:        Invalid input(s): [id number]      Recent Labs      03/10/17   0234  03/11/17   0221  03/12/17   0152   SODIUM  123*  120*  121*   POTASSIUM  5.0  5.3  5.5   CHLORIDE   93*  91*  92*   CO2  23  20  22   BUN  41*  45*  44*   CREATININE  1.84*  1.53*  1.46*   CALCIUM  7.7*  7.5*  7.8*     Recent Labs      03/10/17   0234  171  17   0152   ALTSGPT  20  18  22   ASTSGOT  26  23  24   ALKPHOSPHAT  123*  99  94   TBILIRUBIN  0.2  0.2  0.2   GLUCOSE  155*  196*  156*     No results for input(s): RBC, HEMOGLOBIN, HEMATOCRIT, PLATELETCT, PROTHROMBTM, APTT, INR, IRON, FERRITIN, TOTIRONBC in the last 72 hours.  Recent Labs      03/10/17   0234  17   0152   ASTSGOT  26  23  24   ALTSGPT  20  18  22   ALKPHOSPHAT  123*  99  94   TBILIRUBIN  0.2  0.2  0.2           Hemodynamics:  Temp (24hrs), Av.6 °C (97.9 °F), Min:36.1 °C (96.9 °F), Max:37.1 °C (98.7 °F)  Temperature: 36.1 °C (96.9 °F)  Pulse  Av.6  Min: 61  Max: 81   Blood Pressure : 129/53 mmHg     Respiratory:    Respiration: 18, Pulse Oximetry: 90 %     Work Of Breathing / Effort: Mild  RUL Breath Sounds: Diminished, RML Breath Sounds: Diminished, RLL Breath Sounds: Diminished, JOSE ALEJANDRO Breath Sounds: Diminished, LLL Breath Sounds: Diminished  Fluids:  No intake or output data in the 24 hours ending 17 08  Weight: 79 kg (174 lb 2.6 oz)  GI/Nutrition:  Orders Placed This Encounter   Procedures   • Diet Order     Standing Status: Standing      Number of Occurrences: 1      Standing Expiration Date:      Order Specific Question:  Diet:     Answer:  Regular [1]     Echo: 3/9  Hyperdynamic left ventricular systolic function.  Left ventricular ejection fraction is visually estimated to be greater   than 70%.  Mild concentric left ventricular hypertrophy.  Grade I diastolic dysfunction.  Enlarged right atrium.  Mildly dilated right ventricle.  Trace mitral regurgitation.  Mild aortic stenosis.  Transvalvular gradients are - Peak: 22 mmHg, Mean: 10 mmHg.  This may be, in part,artifactual due to hyperdynamic LV contractility.  Right ventricular systolic pressure is estimated to be 65  mmHg.  Moderate tricuspid regurgitation.    Medical Decision Making, by Problem:  Active Hospital Problems    Diagnosis   • Acute respiratory failure (CMS-HCC) [J96.00]  - from COPD exacerbation and CAP  - on IV abx with ceftriaxone, azithromycin  - on aggressive breathing treatment, duoneb, steroid, albuterol  - initially on 10L--5L--4L--3L of o2 now  - titrate as needed     • Depression [F32.9]     • Polycythemia [D75.1]  - secondary to COPD     • Hyponatremia [E87.1]  - likely SIADH  - worsening  - follow cmp in am  - on IVF  - serum osmolality: 270s  - urine osmolality: pending  - started on 2 g salt tid     • Hyperglycemia [R73.9]  - a1c: 6.1     • Elevated brain natriuretic peptide (BNP) level [R79.89]  - echo: LVEF: 70%, GI diastolic dysfunction  - monitor closely for fluid status     • Elevated troponin [R79.89]  - likely demand ischemia     • COPD exacerbation (CMS-HCC) [J44.1]  - plan as above     • [place]  - Cr: improving  - holding lasix, ibersatan  - avoid nephrotoxic drugs  - follow cmp in am  - started on IVF     • HTN (hypertension) [I10]     • Hypothyroid [E03.9]     • Chronic pain, neck/narcotics [G89.29]     Labs reviewed, Radiology images reviewed and Medications reviewed        DVT Prophylaxis: Heparin      Antibiotics: Treating active infection/contamination beyond 24 hours perioperative coverage

## 2017-03-12 NOTE — CARE PLAN
Problem: Respiratory:  Goal: Respiratory status will improve  Outcome: PROGRESSING AS EXPECTED

## 2017-03-13 LAB
ALBUMIN SERPL BCP-MCNC: 2.8 G/DL (ref 3.2–4.9)
ALBUMIN/GLOB SERPL: 1.2 G/DL
ALP SERPL-CCNC: 94 U/L (ref 30–99)
ALT SERPL-CCNC: 24 U/L (ref 2–50)
ANION GAP SERPL CALC-SCNC: 5 MMOL/L (ref 0–11.9)
AST SERPL-CCNC: 22 U/L (ref 12–45)
BACTERIA BLD CULT: NORMAL
BACTERIA BLD CULT: NORMAL
BILIRUB SERPL-MCNC: 0.2 MG/DL (ref 0.1–1.5)
BUN SERPL-MCNC: 42 MG/DL (ref 8–22)
CALCIUM SERPL-MCNC: 8.4 MG/DL (ref 8.5–10.5)
CHLORIDE SERPL-SCNC: 99 MMOL/L (ref 96–112)
CO2 SERPL-SCNC: 23 MMOL/L (ref 20–33)
CREAT SERPL-MCNC: 1.18 MG/DL (ref 0.5–1.4)
GFR SERPL CREATININE-BSD FRML MDRD: 44 ML/MIN/1.73 M 2
GLOBULIN SER CALC-MCNC: 2.4 G/DL (ref 1.9–3.5)
GLUCOSE SERPL-MCNC: 152 MG/DL (ref 65–99)
POTASSIUM SERPL-SCNC: 4.9 MMOL/L (ref 3.6–5.5)
PROT SERPL-MCNC: 5.2 G/DL (ref 6–8.2)
SIGNIFICANT IND 70042: NORMAL
SIGNIFICANT IND 70042: NORMAL
SITE SITE: NORMAL
SITE SITE: NORMAL
SODIUM SERPL-SCNC: 127 MMOL/L (ref 135–145)
SOURCE SOURCE: NORMAL
SOURCE SOURCE: NORMAL

## 2017-03-13 PROCEDURE — A9270 NON-COVERED ITEM OR SERVICE: HCPCS | Performed by: INTERNAL MEDICINE

## 2017-03-13 PROCEDURE — 80053 COMPREHEN METABOLIC PANEL: CPT

## 2017-03-13 PROCEDURE — 700111 HCHG RX REV CODE 636 W/ 250 OVERRIDE (IP): Performed by: NURSE PRACTITIONER

## 2017-03-13 PROCEDURE — 700102 HCHG RX REV CODE 250 W/ 637 OVERRIDE(OP): Performed by: INTERNAL MEDICINE

## 2017-03-13 PROCEDURE — 700111 HCHG RX REV CODE 636 W/ 250 OVERRIDE (IP): Performed by: INTERNAL MEDICINE

## 2017-03-13 PROCEDURE — 700105 HCHG RX REV CODE 258: Performed by: INTERNAL MEDICINE

## 2017-03-13 PROCEDURE — 770020 HCHG ROOM/CARE - TELE (206)

## 2017-03-13 PROCEDURE — 99233 SBSQ HOSP IP/OBS HIGH 50: CPT | Performed by: INTERNAL MEDICINE

## 2017-03-13 PROCEDURE — 36415 COLL VENOUS BLD VENIPUNCTURE: CPT

## 2017-03-13 RX ORDER — ENALAPRILAT 1.25 MG/ML
1.25 INJECTION INTRAVENOUS EVERY 6 HOURS PRN
Status: DISCONTINUED | OUTPATIENT
Start: 2017-03-13 | End: 2017-03-17 | Stop reason: HOSPADM

## 2017-03-13 RX ORDER — SODIUM CHLORIDE 9 MG/ML
INJECTION, SOLUTION INTRAVENOUS
Status: ACTIVE
Start: 2017-03-13 | End: 2017-03-14

## 2017-03-13 RX ORDER — FUROSEMIDE 10 MG/ML
20 INJECTION INTRAMUSCULAR; INTRAVENOUS
Status: DISCONTINUED | OUTPATIENT
Start: 2017-03-13 | End: 2017-03-16

## 2017-03-13 RX ADMIN — SODIUM CHLORIDE TAB 1 GM 2 G: 1 TAB at 17:18

## 2017-03-13 RX ADMIN — SERTRALINE 100 MG: 100 TABLET, FILM COATED ORAL at 21:21

## 2017-03-13 RX ADMIN — TRAZODONE HYDROCHLORIDE 50 MG: 50 TABLET ORAL at 21:21

## 2017-03-13 RX ADMIN — SODIUM CHLORIDE TAB 1 GM 2 G: 1 TAB at 12:30

## 2017-03-13 RX ADMIN — HYDRALAZINE HYDROCHLORIDE 50 MG: 50 TABLET ORAL at 09:34

## 2017-03-13 RX ADMIN — HYDROCODONE BITARTRATE AND ACETAMINOPHEN 1 TABLET: 10; 325 TABLET ORAL at 17:19

## 2017-03-13 RX ADMIN — LEVOTHYROXINE SODIUM 50 MCG: 50 TABLET ORAL at 05:39

## 2017-03-13 RX ADMIN — CEFTRIAXONE 2 G: 2 INJECTION, POWDER, FOR SOLUTION INTRAMUSCULAR; INTRAVENOUS at 21:21

## 2017-03-13 RX ADMIN — FUROSEMIDE 20 MG: 10 INJECTION, SOLUTION INTRAVENOUS at 12:00

## 2017-03-13 RX ADMIN — SIMVASTATIN 40 MG: 40 TABLET, FILM COATED ORAL at 21:20

## 2017-03-13 RX ADMIN — HEPARIN SODIUM 5000 UNITS: 5000 INJECTION, SOLUTION INTRAVENOUS; SUBCUTANEOUS at 21:20

## 2017-03-13 RX ADMIN — HEPARIN SODIUM 5000 UNITS: 5000 INJECTION, SOLUTION INTRAVENOUS; SUBCUTANEOUS at 13:42

## 2017-03-13 RX ADMIN — HEPARIN SODIUM 5000 UNITS: 5000 INJECTION, SOLUTION INTRAVENOUS; SUBCUTANEOUS at 04:17

## 2017-03-13 RX ADMIN — OMEPRAZOLE 20 MG: 20 CAPSULE, DELAYED RELEASE ORAL at 09:34

## 2017-03-13 RX ADMIN — ASPIRIN 81 MG: 81 TABLET ORAL at 09:35

## 2017-03-13 RX ADMIN — DILTIAZEM HYDROCHLORIDE 180 MG: 180 CAPSULE, COATED, EXTENDED RELEASE ORAL at 21:20

## 2017-03-13 RX ADMIN — SODIUM CHLORIDE TAB 1 GM 2 G: 1 TAB at 09:39

## 2017-03-13 RX ADMIN — OMEPRAZOLE 20 MG: 20 CAPSULE, DELAYED RELEASE ORAL at 21:20

## 2017-03-13 RX ADMIN — ENALAPRILAT 1.25 MG: 1.25 INJECTION INTRAVENOUS at 00:45

## 2017-03-13 RX ADMIN — DILTIAZEM HYDROCHLORIDE 180 MG: 180 CAPSULE, COATED, EXTENDED RELEASE ORAL at 09:34

## 2017-03-13 RX ADMIN — HYDRALAZINE HYDROCHLORIDE 50 MG: 50 TABLET ORAL at 21:21

## 2017-03-13 RX ADMIN — HYDROCODONE BITARTRATE AND ACETAMINOPHEN 1 TABLET: 10; 325 TABLET ORAL at 09:35

## 2017-03-13 RX ADMIN — BUDESONIDE AND FORMOTEROL FUMARATE DIHYDRATE 2 PUFF: 160; 4.5 AEROSOL RESPIRATORY (INHALATION) at 21:20

## 2017-03-13 RX ADMIN — ALPRAZOLAM 1 MG: 0.5 TABLET ORAL at 13:42

## 2017-03-13 RX ADMIN — PREDNISONE 40 MG: 20 TABLET ORAL at 09:35

## 2017-03-13 ASSESSMENT — PAIN SCALES - GENERAL
PAINLEVEL_OUTOF10: 5
PAINLEVEL_OUTOF10: 0
PAINLEVEL_OUTOF10: 0
PAINLEVEL_OUTOF10: 2

## 2017-03-13 ASSESSMENT — ENCOUNTER SYMPTOMS
WHEEZING: 1
SHORTNESS OF BREATH: 1
FEVER: 0
HEARTBURN: 0
SPUTUM PRODUCTION: 1
PALPITATIONS: 0
ABDOMINAL PAIN: 0
EYES NEGATIVE: 1
NEUROLOGICAL NEGATIVE: 1
CHILLS: 0
PSYCHIATRIC NEGATIVE: 1
MUSCULOSKELETAL NEGATIVE: 1
ORTHOPNEA: 0
VOMITING: 0
DIARRHEA: 0
NAUSEA: 0
COUGH: 1

## 2017-03-13 NOTE — PROGRESS NOTES
"Assumed Pt care at 1900. Pt AOx4 and denies any pain. Pt hypertensive during 2000 VS rounds with BP of 182/63. Scheduled vasoactive medications administered (see MAR) in response to HTN. Follow up BP to be re-assessed manually. Pt asymptomatic of HTN aside from anxiety. PRN anti-anxiety medication administered(see MAR) in response to anxiety. Pt IV infiltrated. Pt currently refusing IV replacement despite further education of importance of IV ABX administration. Pt asking for \"break\" before additional attempt of IV placement. Pt witnessed as ambulatory as standbye assist to and from bathroom. Pt updated on immediate POC. Pt denies any other requests at this time. Call light and phone within reach.  "

## 2017-03-13 NOTE — PROGRESS NOTES
"Manual BP assessed in response to hypertensive BP of 192/68 during 0000 VS rounds. Manual /60. Pt has no PRN vasoactive medications ordered. Pt states she has a 5/10 HA when asked. Pt states HA has been baseline over \"last few days\" despite denying any pain during previous pain assessment. Pt is otherwise asymptomatic of HTN and is SR at 70 BPM. On call provider paged for updates.   "

## 2017-03-13 NOTE — PROGRESS NOTES
Hospital Medicine Progress Note, Adult, Complex               Author: Glenn Loo Date & Time created: 3/13/2017  7:48 AM     CC: SOB    Interval History:  75 y/o F with PMH of HTN, COPD, CKD, hypothyroid: admitted for above    Feeling better. Still on 4L of o2. Had a detailed discussion with the family regarding her medical condition and answered all the questions.      Review of Systems:  Review of Systems   Constitutional: Positive for malaise/fatigue. Negative for fever and chills.   HENT: Negative.    Eyes: Negative.    Respiratory: Positive for cough, sputum production, shortness of breath and wheezing.    Cardiovascular: Negative for chest pain, palpitations and orthopnea.   Gastrointestinal: Negative for heartburn, nausea, vomiting, abdominal pain and diarrhea.   Genitourinary: Negative.    Musculoskeletal: Negative.    Neurological: Negative.    Psychiatric/Behavioral: Negative.        Physical Exam:  Physical Exam   Constitutional: She is oriented to person, place, and time. No distress.   HENT:   Head: Normocephalic and atraumatic.   Mouth/Throat: Oropharynx is clear and moist.   Eyes: Conjunctivae are normal. Pupils are equal, round, and reactive to light. Right eye exhibits no discharge. Left eye exhibits no discharge. No scleral icterus.   Neck: Normal range of motion. Neck supple. No thyromegaly present.   Cardiovascular: Normal rate and regular rhythm.    No murmur heard.  Pulmonary/Chest: Effort normal. No respiratory distress. She has wheezes.   Abdominal: Soft. Bowel sounds are normal. She exhibits no distension. There is no tenderness.   Musculoskeletal: She exhibits no edema or tenderness.   Neurological: She is alert and oriented to person, place, and time. No cranial nerve deficit.   Skin: Skin is warm and dry. She is not diaphoretic. No erythema.   Psychiatric: She has a normal mood and affect. Her behavior is normal. Thought content normal.       Labs:        Invalid input(s):  [id number]      Recent Labs      172  17   0118   SODIUM  120*  121*  127*   POTASSIUM  5.3  5.5  4.9   CHLORIDE  91*  92*  99   CO2  20  22  23   BUN  45*  44*  42*   CREATININE  1.53*  1.46*  1.18   CALCIUM  7.5*  7.8*  8.4*     Recent Labs      17   0118   ALTSGPT  18  22  24   ASTSGOT  23  24  22   ALKPHOSPHAT  99  94  94   TBILIRUBIN  0.2  0.2  0.2   GLUCOSE  196*  156*  152*     No results for input(s): RBC, HEMOGLOBIN, HEMATOCRIT, PLATELETCT, PROTHROMBTM, APTT, INR, IRON, FERRITIN, TOTIRONBC in the last 72 hours.  Recent Labs      17   ASTSGOT  23  24  22   ALTSGPT  18  22  24   ALKPHOSPHAT  99  94  94   TBILIRUBIN  0.2  0.2  0.2           Hemodynamics:  Temp (24hrs), Av.4 °C (97.5 °F), Min:35.7 °C (96.2 °F), Max:36.8 °C (98.2 °F)  Temperature: 36.6 °C (97.8 °F)  Pulse  Av.1  Min: 59  Max: 84   Blood Pressure : 129/49 mmHg (RN notified)     Respiratory:    Respiration: 20, Pulse Oximetry: 97 %     Work Of Breathing / Effort: Mild  RUL Breath Sounds: Diminished, RML Breath Sounds: Diminished, RLL Breath Sounds: Diminished, JOSE ALEJANDRO Breath Sounds: Diminished, LLL Breath Sounds: Diminished  Fluids:    Intake/Output Summary (Last 24 hours) at 17 0748  Last data filed at 17 0600   Gross per 24 hour   Intake   1520 ml   Output      0 ml   Net   1520 ml     Weight: 78.9 kg (173 lb 15.1 oz)  GI/Nutrition:  Orders Placed This Encounter   Procedures   • Diet Order     Standing Status: Standing      Number of Occurrences: 1      Standing Expiration Date:      Order Specific Question:  Diet:     Answer:  Regular [1]     Echo: 3/9  Hyperdynamic left ventricular systolic function.  Left ventricular ejection fraction is visually estimated to be greater   than 70%.  Mild concentric left ventricular hypertrophy.  Grade I diastolic dysfunction.  Enlarged right atrium.  Mildly dilated  right ventricle.  Trace mitral regurgitation.  Mild aortic stenosis.  Transvalvular gradients are - Peak: 22 mmHg, Mean: 10 mmHg.  This may be, in part,artifactual due to hyperdynamic LV contractility.  Right ventricular systolic pressure is estimated to be 65 mmHg.  Moderate tricuspid regurgitation.    Medical Decision Making, by Problem:  Active Hospital Problems    Diagnosis   • Acute respiratory failure (CMS-HCC) [J96.00]  - from COPD exacerbation and CAP  - on IV abx with ceftriaxone, azithromycin  - on aggressive breathing treatment, duoneb, steroid, albuterol  - initially on 10L--5L--4L--3L--4L of o2 now  - titrate as needed     • Depression [F32.9]     • Polycythemia [D75.1]  - secondary to COPD     • Hyponatremia [E87.1]  - etiology unclear  - improving  - follow cmp in am  - on IVF  - serum osmolality: 270s  - urine osmolality: 250s(not in SIADH range)  - started on 2 g salt tid     • Hyperglycemia [R73.9]  - a1c: 6.1     • Elevated brain natriuretic peptide (BNP) level [R79.89]  - echo: LVEF: 70%, GI diastolic dysfunction  - monitor closely for fluid status     • Elevated troponin [R79.89]  - likely demand ischemia     • COPD exacerbation (CMS-HCC) [J44.1]  - plan as above     • [place]  - Cr: improving  - holding ibersatan  - avoid nephrotoxic drugs  - follow cmp in am  - will restart above meds if her Cr remain stable tomorrow     • HTN (hypertension) [I10]     • Hypothyroid [E03.9]     • Chronic pain, neck/narcotics [G89.29]       *  Bilateral LE edema: echo: as above        - will start her on low dose IV lasix 20 mg, adjust as needed    Labs reviewed, Radiology images reviewed and Medications reviewed        DVT Prophylaxis: Heparin      Antibiotics: Treating active infection/contamination beyond 24 hours perioperative coverage

## 2017-03-14 PROBLEM — S40.029A HEMATOMA OF ARM: Status: ACTIVE | Noted: 2017-03-14

## 2017-03-14 LAB
ALBUMIN SERPL BCP-MCNC: 2.6 G/DL (ref 3.2–4.9)
ALBUMIN/GLOB SERPL: 1.2 G/DL
ALP SERPL-CCNC: 82 U/L (ref 30–99)
ALT SERPL-CCNC: 22 U/L (ref 2–50)
ANION GAP SERPL CALC-SCNC: 3 MMOL/L (ref 0–11.9)
AST SERPL-CCNC: 19 U/L (ref 12–45)
BILIRUB SERPL-MCNC: 0.2 MG/DL (ref 0.1–1.5)
BUN SERPL-MCNC: 39 MG/DL (ref 8–22)
CALCIUM SERPL-MCNC: 8.1 MG/DL (ref 8.5–10.5)
CHLORIDE SERPL-SCNC: 105 MMOL/L (ref 96–112)
CO2 SERPL-SCNC: 26 MMOL/L (ref 20–33)
CREAT SERPL-MCNC: 1.24 MG/DL (ref 0.5–1.4)
GFR SERPL CREATININE-BSD FRML MDRD: 42 ML/MIN/1.73 M 2
GLOBULIN SER CALC-MCNC: 2.2 G/DL (ref 1.9–3.5)
GLUCOSE SERPL-MCNC: 113 MG/DL (ref 65–99)
POTASSIUM SERPL-SCNC: 5 MMOL/L (ref 3.6–5.5)
PROT SERPL-MCNC: 4.8 G/DL (ref 6–8.2)
SODIUM SERPL-SCNC: 134 MMOL/L (ref 135–145)

## 2017-03-14 PROCEDURE — 700102 HCHG RX REV CODE 250 W/ 637 OVERRIDE(OP): Performed by: INTERNAL MEDICINE

## 2017-03-14 PROCEDURE — 99233 SBSQ HOSP IP/OBS HIGH 50: CPT | Performed by: INTERNAL MEDICINE

## 2017-03-14 PROCEDURE — 36415 COLL VENOUS BLD VENIPUNCTURE: CPT

## 2017-03-14 PROCEDURE — A9270 NON-COVERED ITEM OR SERVICE: HCPCS | Performed by: INTERNAL MEDICINE

## 2017-03-14 PROCEDURE — 700105 HCHG RX REV CODE 258: Performed by: INTERNAL MEDICINE

## 2017-03-14 PROCEDURE — 700111 HCHG RX REV CODE 636 W/ 250 OVERRIDE (IP): Performed by: INTERNAL MEDICINE

## 2017-03-14 PROCEDURE — 770020 HCHG ROOM/CARE - TELE (206)

## 2017-03-14 PROCEDURE — 80053 COMPREHEN METABOLIC PANEL: CPT

## 2017-03-14 PROCEDURE — 700111 HCHG RX REV CODE 636 W/ 250 OVERRIDE (IP): Performed by: NURSE PRACTITIONER

## 2017-03-14 RX ORDER — FUROSEMIDE 20 MG/1
20 TABLET ORAL
Status: DISCONTINUED | OUTPATIENT
Start: 2017-03-14 | End: 2017-03-15

## 2017-03-14 RX ADMIN — ENALAPRILAT 1.25 MG: 1.25 INJECTION INTRAVENOUS at 04:19

## 2017-03-14 RX ADMIN — SODIUM CHLORIDE TAB 1 GM 2 G: 1 TAB at 09:08

## 2017-03-14 RX ADMIN — HEPARIN SODIUM 5000 UNITS: 5000 INJECTION, SOLUTION INTRAVENOUS; SUBCUTANEOUS at 21:15

## 2017-03-14 RX ADMIN — TIOTROPIUM BROMIDE 1 CAPSULE: 18 CAPSULE ORAL; RESPIRATORY (INHALATION) at 09:09

## 2017-03-14 RX ADMIN — PREDNISONE 40 MG: 20 TABLET ORAL at 09:08

## 2017-03-14 RX ADMIN — HYDRALAZINE HYDROCHLORIDE 50 MG: 50 TABLET ORAL at 09:08

## 2017-03-14 RX ADMIN — ALPRAZOLAM 1 MG: 0.5 TABLET ORAL at 17:38

## 2017-03-14 RX ADMIN — LEVOTHYROXINE SODIUM 50 MCG: 50 TABLET ORAL at 06:09

## 2017-03-14 RX ADMIN — BUDESONIDE AND FORMOTEROL FUMARATE DIHYDRATE 2 PUFF: 160; 4.5 AEROSOL RESPIRATORY (INHALATION) at 21:19

## 2017-03-14 RX ADMIN — OMEPRAZOLE 20 MG: 20 CAPSULE, DELAYED RELEASE ORAL at 21:14

## 2017-03-14 RX ADMIN — OMEPRAZOLE 20 MG: 20 CAPSULE, DELAYED RELEASE ORAL at 09:07

## 2017-03-14 RX ADMIN — HYDRALAZINE HYDROCHLORIDE 50 MG: 50 TABLET ORAL at 21:14

## 2017-03-14 RX ADMIN — FUROSEMIDE 20 MG: 10 INJECTION, SOLUTION INTRAVENOUS at 12:52

## 2017-03-14 RX ADMIN — SODIUM CHLORIDE TAB 1 GM 2 G: 1 TAB at 12:27

## 2017-03-14 RX ADMIN — TRAZODONE HYDROCHLORIDE 50 MG: 50 TABLET ORAL at 21:14

## 2017-03-14 RX ADMIN — HEPARIN SODIUM 5000 UNITS: 5000 INJECTION, SOLUTION INTRAVENOUS; SUBCUTANEOUS at 14:36

## 2017-03-14 RX ADMIN — SERTRALINE 100 MG: 100 TABLET, FILM COATED ORAL at 21:15

## 2017-03-14 RX ADMIN — DILTIAZEM HYDROCHLORIDE 180 MG: 180 CAPSULE, COATED, EXTENDED RELEASE ORAL at 21:14

## 2017-03-14 RX ADMIN — BUDESONIDE AND FORMOTEROL FUMARATE DIHYDRATE 2 PUFF: 160; 4.5 AEROSOL RESPIRATORY (INHALATION) at 09:08

## 2017-03-14 RX ADMIN — CEFTRIAXONE 2 G: 2 INJECTION, POWDER, FOR SOLUTION INTRAMUSCULAR; INTRAVENOUS at 21:15

## 2017-03-14 RX ADMIN — HEPARIN SODIUM 5000 UNITS: 5000 INJECTION, SOLUTION INTRAVENOUS; SUBCUTANEOUS at 06:09

## 2017-03-14 RX ADMIN — ASPIRIN 81 MG: 81 TABLET ORAL at 09:07

## 2017-03-14 RX ADMIN — DILTIAZEM HYDROCHLORIDE 180 MG: 180 CAPSULE, COATED, EXTENDED RELEASE ORAL at 09:07

## 2017-03-14 RX ADMIN — SIMVASTATIN 40 MG: 40 TABLET, FILM COATED ORAL at 21:14

## 2017-03-14 ASSESSMENT — PAIN SCALES - GENERAL
PAINLEVEL_OUTOF10: 0

## 2017-03-14 NOTE — CARE PLAN
Problem: Communication  Goal: The ability to communicate needs accurately and effectively will improve  Outcome: PROGRESSING AS EXPECTED  Pt is alert and oriented x 4 . Pt is oriented to the environment and call light. I have discussed with the pt the plan of care, treatments and medications and the pt verbalizes agreement and understanding. The pt has been able to communicate her needs effectively and has been given the opportunity to ask any questions. All pt needs have been met and questions have been answered at this time. Hourly rounding in place.         Problem: Safety  Goal: Will remain free from injury  Outcome: PROGRESSING AS EXPECTED  Pt assessed at beginning of shift. Pt determined to be 1x assist . Fall precautions in place. Call light and personal possessions in reach, bed alarm on . Hourly rounding in place.

## 2017-03-14 NOTE — CARE PLAN
Problem: Skin Integrity  Goal: Risk for impaired skin integrity will decrease  Intervention: Assess risk factors for impaired skin integrity and/or pressure ulcers  Discussed with patient the importance of changing position when lying in bed at least every two hours to prevent skin breakdown. Patient very accepting of the information. All questions answered at this time.

## 2017-03-14 NOTE — CARE PLAN
Problem: Safety  Goal: Will remain free from falls  Intervention: Assess risk factors for falls  Discussed with patient the importance of calling for assistance before getting out of bed to avoid falls. Patient very accepting of the information. All questions answered at this time.

## 2017-03-14 NOTE — PROGRESS NOTES
Received report from night RN. Assumed care of patient. Patient A&O x4. Patient on 3L o2 NC. Pt has 20g IV in right forearm saline locked. Patient declines any pain. Bed locked and in lowest position with call light within reach.

## 2017-03-14 NOTE — PROGRESS NOTES
Assumed care of patient bedside report received from [place] updated on POC, call light within reach and fall precautions in place. Bed locked and in lowest position. Patient instructed to call for assistance before getting out of bed. All questions answered, no other needs at this time.

## 2017-03-15 LAB
ANION GAP SERPL CALC-SCNC: 6 MMOL/L (ref 0–11.9)
BNP SERPL-MCNC: 246 PG/ML (ref 0–100)
BUN SERPL-MCNC: 33 MG/DL (ref 8–22)
CALCIUM SERPL-MCNC: 7.7 MG/DL (ref 8.5–10.5)
CHLORIDE SERPL-SCNC: 109 MMOL/L (ref 96–112)
CO2 SERPL-SCNC: 26 MMOL/L (ref 20–33)
CREAT SERPL-MCNC: 1.09 MG/DL (ref 0.5–1.4)
ERYTHROCYTE [DISTWIDTH] IN BLOOD BY AUTOMATED COUNT: 51.8 FL (ref 35.9–50)
GFR SERPL CREATININE-BSD FRML MDRD: 49 ML/MIN/1.73 M 2
GLUCOSE SERPL-MCNC: 124 MG/DL (ref 65–99)
HCT VFR BLD AUTO: 38.9 % (ref 37–47)
HGB BLD-MCNC: 12.4 G/DL (ref 12–16)
MCH RBC QN AUTO: 30.4 PG (ref 27–33)
MCHC RBC AUTO-ENTMCNC: 31.9 G/DL (ref 33.6–35)
MCV RBC AUTO: 95.3 FL (ref 81.4–97.8)
PLATELET # BLD AUTO: 212 K/UL (ref 164–446)
PMV BLD AUTO: 10.4 FL (ref 9–12.9)
POTASSIUM SERPL-SCNC: 4.5 MMOL/L (ref 3.6–5.5)
RBC # BLD AUTO: 4.08 M/UL (ref 4.2–5.4)
SODIUM SERPL-SCNC: 141 MMOL/L (ref 135–145)
WBC # BLD AUTO: 12.1 K/UL (ref 4.8–10.8)

## 2017-03-15 PROCEDURE — 770020 HCHG ROOM/CARE - TELE (206)

## 2017-03-15 PROCEDURE — 99233 SBSQ HOSP IP/OBS HIGH 50: CPT | Performed by: INTERNAL MEDICINE

## 2017-03-15 PROCEDURE — 700111 HCHG RX REV CODE 636 W/ 250 OVERRIDE (IP): Performed by: INTERNAL MEDICINE

## 2017-03-15 PROCEDURE — A9270 NON-COVERED ITEM OR SERVICE: HCPCS | Performed by: INTERNAL MEDICINE

## 2017-03-15 PROCEDURE — 700102 HCHG RX REV CODE 250 W/ 637 OVERRIDE(OP): Performed by: INTERNAL MEDICINE

## 2017-03-15 PROCEDURE — 36415 COLL VENOUS BLD VENIPUNCTURE: CPT

## 2017-03-15 PROCEDURE — 83880 ASSAY OF NATRIURETIC PEPTIDE: CPT

## 2017-03-15 PROCEDURE — 80048 BASIC METABOLIC PNL TOTAL CA: CPT

## 2017-03-15 PROCEDURE — 85027 COMPLETE CBC AUTOMATED: CPT

## 2017-03-15 PROCEDURE — 700111 HCHG RX REV CODE 636 W/ 250 OVERRIDE (IP): Performed by: NURSE PRACTITIONER

## 2017-03-15 PROCEDURE — 700105 HCHG RX REV CODE 258: Performed by: INTERNAL MEDICINE

## 2017-03-15 RX ORDER — CLONIDINE HYDROCHLORIDE 0.1 MG/1
0.1 TABLET ORAL EVERY 4 HOURS PRN
Status: DISCONTINUED | OUTPATIENT
Start: 2017-03-15 | End: 2017-03-17 | Stop reason: HOSPADM

## 2017-03-15 RX ORDER — AMLODIPINE BESYLATE 5 MG/1
5 TABLET ORAL
Status: DISCONTINUED | OUTPATIENT
Start: 2017-03-16 | End: 2017-03-17 | Stop reason: HOSPADM

## 2017-03-15 RX ORDER — HYDRALAZINE HYDROCHLORIDE 20 MG/ML
10 INJECTION INTRAMUSCULAR; INTRAVENOUS EVERY 4 HOURS PRN
Status: DISCONTINUED | OUTPATIENT
Start: 2017-03-15 | End: 2017-03-17 | Stop reason: HOSPADM

## 2017-03-15 RX ORDER — SIMVASTATIN 10 MG
10 TABLET ORAL EVERY EVENING
Status: DISCONTINUED | OUTPATIENT
Start: 2017-03-16 | End: 2017-03-17 | Stop reason: HOSPADM

## 2017-03-15 RX ADMIN — HEPARIN SODIUM 5000 UNITS: 5000 INJECTION, SOLUTION INTRAVENOUS; SUBCUTANEOUS at 05:29

## 2017-03-15 RX ADMIN — SIMVASTATIN 40 MG: 40 TABLET, FILM COATED ORAL at 20:31

## 2017-03-15 RX ADMIN — FUROSEMIDE 20 MG: 10 INJECTION, SOLUTION INTRAVENOUS at 07:58

## 2017-03-15 RX ADMIN — HYDRALAZINE HYDROCHLORIDE 50 MG: 50 TABLET ORAL at 20:32

## 2017-03-15 RX ADMIN — BUDESONIDE AND FORMOTEROL FUMARATE DIHYDRATE 2 PUFF: 160; 4.5 AEROSOL RESPIRATORY (INHALATION) at 20:35

## 2017-03-15 RX ADMIN — CEFTRIAXONE 2 G: 2 INJECTION, POWDER, FOR SOLUTION INTRAMUSCULAR; INTRAVENOUS at 20:35

## 2017-03-15 RX ADMIN — LEVOTHYROXINE SODIUM 50 MCG: 50 TABLET ORAL at 05:28

## 2017-03-15 RX ADMIN — PREDNISONE 40 MG: 20 TABLET ORAL at 07:58

## 2017-03-15 RX ADMIN — ASPIRIN 81 MG: 81 TABLET ORAL at 07:58

## 2017-03-15 RX ADMIN — TIOTROPIUM BROMIDE 1 CAPSULE: 18 CAPSULE ORAL; RESPIRATORY (INHALATION) at 07:58

## 2017-03-15 RX ADMIN — DILTIAZEM HYDROCHLORIDE 180 MG: 180 CAPSULE, COATED, EXTENDED RELEASE ORAL at 20:32

## 2017-03-15 RX ADMIN — DILTIAZEM HYDROCHLORIDE 180 MG: 180 CAPSULE, COATED, EXTENDED RELEASE ORAL at 07:58

## 2017-03-15 RX ADMIN — HYDRALAZINE HYDROCHLORIDE 50 MG: 50 TABLET ORAL at 07:58

## 2017-03-15 RX ADMIN — ENALAPRILAT 1.25 MG: 1.25 INJECTION INTRAVENOUS at 12:33

## 2017-03-15 RX ADMIN — Medication 2 TABLET: at 07:58

## 2017-03-15 RX ADMIN — HEPARIN SODIUM 5000 UNITS: 5000 INJECTION, SOLUTION INTRAVENOUS; SUBCUTANEOUS at 14:53

## 2017-03-15 RX ADMIN — BUDESONIDE AND FORMOTEROL FUMARATE DIHYDRATE 2 PUFF: 160; 4.5 AEROSOL RESPIRATORY (INHALATION) at 07:57

## 2017-03-15 RX ADMIN — TRAZODONE HYDROCHLORIDE 50 MG: 50 TABLET ORAL at 20:32

## 2017-03-15 RX ADMIN — FUROSEMIDE 20 MG: 20 TABLET ORAL at 05:28

## 2017-03-15 RX ADMIN — SERTRALINE 100 MG: 100 TABLET, FILM COATED ORAL at 20:32

## 2017-03-15 RX ADMIN — OMEPRAZOLE 20 MG: 20 CAPSULE, DELAYED RELEASE ORAL at 07:58

## 2017-03-15 RX ADMIN — OMEPRAZOLE 20 MG: 20 CAPSULE, DELAYED RELEASE ORAL at 20:32

## 2017-03-15 ASSESSMENT — PAIN SCALES - GENERAL
PAINLEVEL_OUTOF10: 0

## 2017-03-15 NOTE — DOCUMENTATION QUERY
DOCUMENTATION QUERY    PROVIDERS: Please select “Cosign w/ note”to reply to query.    To better represent the severity of illness and risk of mortality of your patient, please review the following information and exercise your independent professional judgment in responding to this query.     CKD (chronic kidney disease) (Stage unspecified) is documented in the Progress Notes. Please clarify the Stage of Chronic Kidney Disease.    • Chronic Kidney Disease Stage I      • Chronic Kidney Disease Stage II     • Chronic Kidney Disease Stage III    • Chronic Kidney Disease Stage IV    • Chronic Kidney Disease Stage V     • End Stage Renal Disease  • Other explanation of clinical findings  • Unable to determine       The stages of CKD according to the [employer] are as follows:   Stage I: GFR >90   Stage II: GFR 60-89   Stage III: GFR 30-59   Stage IV: GFR 15-29   Stage V: GFR <15        The medical record reflects the following:   Clinical Findings Noted: CKD (chronic kidney disease); GFR 27 - 50; [place]   Treatment  Labs   Risk Factors  Hypertension   Location within medical record  Progress Notes     Thank you,   Kassie De Souza RN   Clinical   Phone - [phone]

## 2017-03-15 NOTE — CARE PLAN
Problem: Knowledge Deficit  Goal: Knowledge of disease process/condition, treatment plan, diagnostic tests, and medications will improve  Intervention: Assess knowledge level of disease process/condition, treatment plan, diagnostic tests, and medications  Assess understanding of discharge criteria (BP & O2 needs)

## 2017-03-15 NOTE — DISCHARGE PLANNING
Care Transition Team Assessment    SW met with pt at bedside and pt indicated she would like to be discharged home with her adult daughter as soon as possible, preferably today. Pt states she lives with her grandchildren and her adult daughter. Pt states she will need an oxygen tank at home as she does not have one. Pt is open to [facility] assisting her if necessary, but she prefers to be home and not in the hospital or in a facility. Pt's PCP is Dr. Hari M.D., and she does have transportation.    Pt denies drug or alcohol use, though reports she does have depression and anxiety. Pt states her PCP provides her with prescriptions for her antidepressant medication and Xanax for her anxiety. Pt feels she has adequate support at home and does not feel she will need any referrals outside of one for oxygen. Pt denies having a DPOA, but accepted SW offer for information and a booklet. Pt stated SW and medical staff may speak with her daughter, Christina Cuevas, about any of her medical needs/information. SW educated pt on the difference between her daughter being involved versus pt actually naming Christina as a DPOA in appropriate documents. Pt acknowledges she understands and will review information provided to her.      Information Source  Orientation : Oriented x 4  Information Given By: Patient  Informant's Name: Kyaw Cuevas  Who is responsible for making decisions for patient? : Patient    Readmission Evaluation  Is this a readmission?: No    Elopement Risk  Legal Hold: No  Ambulatory or Self Mobile in Wheelchair: Yes  Disoriented: No  Psychiatric Symptoms: None  History of Wandering: No  Elopement this Admit: No  Vocalizing Wanting to Leave: No  Displays Behaviors, Body Language Wanting to Leave: No-Not at Risk for Elopement  Elopement Risk: Not at Risk for Elopement    Interdisciplinary Discharge Planning  Does Admitting Nurse Feel This Could be a Complex Discharge?: No  Primary Care Physician: Dr. Noriega  Lives with -  Patient's Self Care Capacity: Adult Children  Patient or legal guardian wants to designate a caregiver (see row info): No  Support Systems: Children, Friends / Neighbors  Housing / Facility: 1 Story House  Do You Take your Prescribed Medications Regularly: Yes  Able to Return to Previous ADL's: Yes  Mobility Issues: No  Prior Services: None  Patient Expects to be Discharged to:: home  Assistance Needed: No  Durable Medical Equipment: Not Applicable    Discharge Preparedness  What is your plan after discharge?: Home with help  What are your discharge supports?: Child  Prior Functional Level: Ambulatory  Difficulity with ADLs: None  Difficulity with IADLs: None    Functional Assesment  Prior Functional Level: Ambulatory    Finances  Financial Barriers to Discharge: No  Prescription Coverage: Yes    Vision / Hearing Impairment  Vision Impairment : No  Hearing Impairment : No    Values / Beliefs / Concerns  Values / Beliefs Concerns : No    Advance Directive  Advance Directive?: None  Advance Directive offered?: AD Booklet given    Domestic Abuse  Have you ever been the victim of abuse or violence?: No  Physical Abuse or Sexual Abuse: No  Verbal Abuse or Emotional Abuse: No  Possible Abuse Reported to:: Not Applicable    Psychological Assessment  History of Substance Abuse: None  History of Psychiatric Problems: Yes (Pt reports having depression and anxiety.)  Non-compliant with Treatment: No (Pt reports taking psychotropic medications.)  Newly Diagnosed Illness: No    Discharge Risks or Barriers  Discharge risks or barriers?: No    Anticipated Discharge Information  Anticipated discharge disposition: Home  Discharge Address: [address] [place] [address]  Discharge Contact Phone Number: [phone]

## 2017-03-15 NOTE — PROGRESS NOTES
HOSPITALIST PROGRESS NOTE (SOAP)   Date of Service  3/14  CHIEF COMPLAINT  76 y.o.yo female presented 3/8/2017 with Shortness of Breath and Cough    SUBJECTIVE  Chart reviewed. Not short of breath but wheezing. Deconditioned.  PHYSICAL EXAM  Filed Vitals:    03/14/17 0350 03/14/17 0800 03/14/17 1200 03/14/17 1800   BP: 173/80 170/84 187/69 188/65   Pulse: 75 64 94 77   Temp: 36.7 °C (98 °F) 36.4 °C (97.5 °F) 36.6 °C (97.9 °F) 36.7 °C (98.1 °F)   Resp: 17 17 17 18   Height:       Weight:       SpO2: 93% 94% 93% 94%       Intake/Output Summary (Last 24 hours) at 03/14/17 2143  Last data filed at 03/14/17 1800   Gross per 24 hour   Intake      0 ml   Output    220 ml   Net   -220 ml   Vitals reviewed  General/Constitutional: No acute distress.   Head: Normocephalic, atraumatic  ENT: Oral mucosa is moist. No obvious pharyngeal exudates  Eyes: Pink conjunctiva, no scleral icterus  Neck: Supple, no lymphadenopathy  Cardiovascular: Normal rate and regular rhythm. S1,2 noted. No murmurs, gallops or rubs.  Pulmonary: OIcc wheezing, cough  Abdominal: Soft, nontender, not distended, bowel sounds normoactive.  Musculoskeletal: No tenderness to palpation of chest wall.  Neurologic: Grossly nonfocal, moving all extremities.  Genitourinary: No gross hematuria  Skin: No obvious rash.  Psychiatric: Pleasant, cooperative.    LABS  Lab Results   Component Value Date/Time    WBC 9.0 03/09/2017 01:45 AM    RBC 5.07 03/09/2017 01:45 AM    HEMOGLOBIN 15.4 03/09/2017 01:45 AM    HEMATOCRIT 49.3* 03/09/2017 01:45 AM    MCV 97.2 03/09/2017 01:45 AM    MCH 30.4 03/09/2017 01:45 AM    MCHC 31.2* 03/09/2017 01:45 AM    MPV 10.2 03/09/2017 01:45 AM    NEUTROPHILS-POLYS 71.10 03/08/2017 05:30 PM    LYMPHOCYTES 18.10* 03/08/2017 05:30 PM    MONOCYTES 8.90 03/08/2017 05:30 PM    EOSINOPHILS 1.00 03/08/2017 05:30 PM    BASOPHILS 0.50 03/08/2017 05:30 PM      Lab Results   Component Value Date/Time    SODIUM 134* 03/14/2017 03:57 AM    POTASSIUM 5.0  03/14/2017 03:57 AM    CHLORIDE 105 03/14/2017 03:57 AM    CO2 26 03/14/2017 03:57 AM    GLUCOSE 113* 03/14/2017 03:57 AM    BUN 39* 03/14/2017 03:57 AM    CREATININE 1.24 03/14/2017 03:57 AM      No results found for: PROTHROMBTM, INR   BLOOD CULTURE   Date Value Ref Range Status   03/08/2017 No growth after 5 days of incubation.  Final   03/08/2017 No growth after 5 days of incubation.  Final     Labs reviewed  Imaging reviewed  ASSESSMENT and PLAN    Active Problems:    COPD exacerbation (CMS-HCC)    Acute respiratory failure with hypoxia  On inhalers, breathing treatments, PO steroids and IV antibiotics. Switch to PO antibiotics if same or better tomorrow. Will need exercise oximetry.     INDIA (acute kidney injury) (CMS-HCC)    CKD (chronic kidney disease)  Cr- baseline if not better than before. Resolved.    HTN (hypertension)  Uncontrolled. Start low dose Lasix and trend Cr-, potassium since echo showed Grade 1 diastolic dysfunction     Hypothyroid  Continue Synthroid    Chronic pain, neck/narcotics  Stable, not on scheduled narcotics.     Depression  On trazodone.    Polycythemia  Likely reactive from smoking history and pulmonary issues. Continue baby aspirin    Hyponatremia  UOsm not that high. Could be hypovolemia vs SIADH related to pulmonary issues. Resolved but rate of rise somewhat fast (7 in 12 hours). Stop salt tabs.    Hyperglycemia  A1c is 6.1 and only mild hyperglycemia. Dietary modification; defer to Denver J Miller, M.D.if he is a candidate for oral hypoglycemics.    Elevated brain natriuretic peptide (BNP) level    Elevated troponin  Very mild troponin elevation. Likely poor troponin clearance from CKD, demand ischemia/NSTEMI type 2 from hypoxia. Follow up Denver J Miller, M.D.and defer elective stress test.    Hyperkalemia  Mild. WIll start Lasix for BP and diastolic dysfunction, this will help with lowering K+.    Hematoma of arm  From IV. Mild swelling. Keep arm elevated. Hb stable. Trend  H/H. Can use compressive dressing if needed. Switch IV sites.    Pharmaoclogic DVT prophylaxis.    I spent 43 minutes, reviewing the chart, notes, vitals, labs, imaging, ordering labs, evaluating Kyaw Cuevas for assessment, enacting the plan above and writing this note. Allof above were discussed. Medical decision making is therefore complex.

## 2017-03-15 NOTE — CARE PLAN
Problem: Safety  Goal: Will remain free from injury  Outcome: PROGRESSING AS EXPECTED  Pt educated on the importance fall prevention methods, such as treaded sock and the bed alarm. Pt stated they will use the call light prior to any attempts of ambulation. Ambulatory ability assessed, treaded socks in place, bed locked and in low position, frequent trips to bathroom offered, IV pole on same side of bed as bathroom, and call light and phone within reach.          Problem: Venous Thromboembolism (VTW)/Deep Vein Thrombosis (DVT) Prevention:  Goal: Patient will participate in Venous Thrombosis (VTE)/Deep Vein Thrombosis (DVT)Prevention Measures  Outcome: PROGRESSING AS EXPECTED  Pt assessed for s/sx of DVTs, none present at this time. Hep SQ in place for DVT prophylaxis. Pt educated on the importance of turns and ambulation to prevent DVTs. Pt verbalized understanding.

## 2017-03-15 NOTE — PROGRESS NOTES
Pt A&Ox4, awake in bed. IV site intact. SR on tele. O2 @ 4L.NC Denies pain or SOB at this time. Will continue to monitor.

## 2017-03-15 NOTE — PROGRESS NOTES
Received report from Inge COSTELLO and assumed care of pt. Pt is A&Ox4. No signs or symptoms of pain or distress. Assessment completed and plan of care discussed. Pt verbalized understanding of call light and communication board. Patient questions answered at this time. Fall precautions in place. Patient needs met at this time. Hourly rounding in place.

## 2017-03-15 NOTE — CARE PLAN
Problem: Safety  Goal: Will remain free from injury  Intervention: Provide assistance with mobility  Ambulate pt in hallways tis with O2

## 2017-03-16 ENCOUNTER — APPOINTMENT (OUTPATIENT)
Dept: RADIOLOGY | Facility: MEDICAL CENTER | Age: 77
DRG: 189 | End: 2017-03-16
Attending: INTERNAL MEDICINE
Payer: MEDICARE

## 2017-03-16 LAB
ANION GAP SERPL CALC-SCNC: 2 MMOL/L (ref 0–11.9)
BNP SERPL-MCNC: 178 PG/ML (ref 0–100)
BUN SERPL-MCNC: 30 MG/DL (ref 8–22)
CALCIUM SERPL-MCNC: 8 MG/DL (ref 8.5–10.5)
CHLORIDE SERPL-SCNC: 106 MMOL/L (ref 96–112)
CO2 SERPL-SCNC: 31 MMOL/L (ref 20–33)
CREAT SERPL-MCNC: 0.77 MG/DL (ref 0.5–1.4)
DEPRECATED D DIMER PPP IA-ACNC: 202 NG/ML(D-DU)
ERYTHROCYTE [DISTWIDTH] IN BLOOD BY AUTOMATED COUNT: 50.6 FL (ref 35.9–50)
GFR SERPL CREATININE-BSD FRML MDRD: >60 ML/MIN/1.73 M 2
GLUCOSE SERPL-MCNC: 83 MG/DL (ref 65–99)
HCT VFR BLD AUTO: 36.2 % (ref 37–47)
HGB BLD-MCNC: 11.5 G/DL (ref 12–16)
MCH RBC QN AUTO: 29.8 PG (ref 27–33)
MCHC RBC AUTO-ENTMCNC: 31.8 G/DL (ref 33.6–35)
MCV RBC AUTO: 93.8 FL (ref 81.4–97.8)
PLATELET # BLD AUTO: 209 K/UL (ref 164–446)
PMV BLD AUTO: 10.1 FL (ref 9–12.9)
POTASSIUM SERPL-SCNC: 4.4 MMOL/L (ref 3.6–5.5)
RBC # BLD AUTO: 3.86 M/UL (ref 4.2–5.4)
SODIUM SERPL-SCNC: 139 MMOL/L (ref 135–145)
WBC # BLD AUTO: 12.5 K/UL (ref 4.8–10.8)

## 2017-03-16 PROCEDURE — 85027 COMPLETE CBC AUTOMATED: CPT

## 2017-03-16 PROCEDURE — G8979 MOBILITY GOAL STATUS: HCPCS | Mod: CI

## 2017-03-16 PROCEDURE — 770006 HCHG ROOM/CARE - MED/SURG/GYN SEMI*

## 2017-03-16 PROCEDURE — 97162 PT EVAL MOD COMPLEX 30 MIN: CPT

## 2017-03-16 PROCEDURE — 307059 PAD,EAR PROTECTOR: Performed by: INTERNAL MEDICINE

## 2017-03-16 PROCEDURE — 80048 BASIC METABOLIC PNL TOTAL CA: CPT

## 2017-03-16 PROCEDURE — 700102 HCHG RX REV CODE 250 W/ 637 OVERRIDE(OP): Performed by: INTERNAL MEDICINE

## 2017-03-16 PROCEDURE — A9270 NON-COVERED ITEM OR SERVICE: HCPCS | Performed by: INTERNAL MEDICINE

## 2017-03-16 PROCEDURE — 71010 DX-CHEST-PORTABLE (1 VIEW): CPT

## 2017-03-16 PROCEDURE — G8980 MOBILITY D/C STATUS: HCPCS | Mod: CI

## 2017-03-16 PROCEDURE — 36415 COLL VENOUS BLD VENIPUNCTURE: CPT

## 2017-03-16 PROCEDURE — 83880 ASSAY OF NATRIURETIC PEPTIDE: CPT

## 2017-03-16 PROCEDURE — G8978 MOBILITY CURRENT STATUS: HCPCS | Mod: CI

## 2017-03-16 PROCEDURE — 700111 HCHG RX REV CODE 636 W/ 250 OVERRIDE (IP): Performed by: INTERNAL MEDICINE

## 2017-03-16 PROCEDURE — 85379 FIBRIN DEGRADATION QUANT: CPT

## 2017-03-16 PROCEDURE — 99233 SBSQ HOSP IP/OBS HIGH 50: CPT | Performed by: INTERNAL MEDICINE

## 2017-03-16 RX ORDER — FUROSEMIDE 20 MG/1
20 TABLET ORAL
Status: DISCONTINUED | OUTPATIENT
Start: 2017-03-17 | End: 2017-03-16

## 2017-03-16 RX ORDER — FUROSEMIDE 20 MG/1
20 TABLET ORAL
Status: DISCONTINUED | OUTPATIENT
Start: 2017-03-16 | End: 2017-03-17 | Stop reason: HOSPADM

## 2017-03-16 RX ADMIN — HYDRALAZINE HYDROCHLORIDE 50 MG: 50 TABLET ORAL at 21:22

## 2017-03-16 RX ADMIN — SIMVASTATIN 10 MG: 10 TABLET, FILM COATED ORAL at 21:22

## 2017-03-16 RX ADMIN — AMLODIPINE BESYLATE 5 MG: 5 TABLET ORAL at 08:42

## 2017-03-16 RX ADMIN — HYDRALAZINE HYDROCHLORIDE 50 MG: 50 TABLET ORAL at 08:39

## 2017-03-16 RX ADMIN — ALPRAZOLAM 1 MG: 0.5 TABLET ORAL at 21:23

## 2017-03-16 RX ADMIN — TIOTROPIUM BROMIDE 1 CAPSULE: 18 CAPSULE ORAL; RESPIRATORY (INHALATION) at 11:15

## 2017-03-16 RX ADMIN — PREDNISONE 40 MG: 20 TABLET ORAL at 08:38

## 2017-03-16 RX ADMIN — DILTIAZEM HYDROCHLORIDE 180 MG: 180 CAPSULE, COATED, EXTENDED RELEASE ORAL at 21:22

## 2017-03-16 RX ADMIN — OMEPRAZOLE 20 MG: 20 CAPSULE, DELAYED RELEASE ORAL at 08:39

## 2017-03-16 RX ADMIN — OMEPRAZOLE 20 MG: 20 CAPSULE, DELAYED RELEASE ORAL at 21:22

## 2017-03-16 RX ADMIN — BUDESONIDE AND FORMOTEROL FUMARATE DIHYDRATE 2 PUFF: 160; 4.5 AEROSOL RESPIRATORY (INHALATION) at 21:22

## 2017-03-16 RX ADMIN — SERTRALINE 100 MG: 100 TABLET, FILM COATED ORAL at 21:22

## 2017-03-16 RX ADMIN — DILTIAZEM HYDROCHLORIDE 180 MG: 180 CAPSULE, COATED, EXTENDED RELEASE ORAL at 08:39

## 2017-03-16 RX ADMIN — Medication 2 TABLET: at 08:38

## 2017-03-16 RX ADMIN — TRAZODONE HYDROCHLORIDE 50 MG: 50 TABLET ORAL at 21:22

## 2017-03-16 RX ADMIN — LEVOTHYROXINE SODIUM 50 MCG: 50 TABLET ORAL at 05:58

## 2017-03-16 RX ADMIN — FUROSEMIDE 20 MG: 20 TABLET ORAL at 08:44

## 2017-03-16 RX ADMIN — ASPIRIN 81 MG: 81 TABLET ORAL at 08:39

## 2017-03-16 RX ADMIN — BUDESONIDE AND FORMOTEROL FUMARATE DIHYDRATE 2 PUFF: 160; 4.5 AEROSOL RESPIRATORY (INHALATION) at 08:42

## 2017-03-16 ASSESSMENT — PAIN SCALES - GENERAL
PAINLEVEL_OUTOF10: 0

## 2017-03-16 ASSESSMENT — LIFESTYLE VARIABLES: DO YOU DRINK ALCOHOL: NO

## 2017-03-16 ASSESSMENT — GAIT ASSESSMENTS
GAIT LEVEL OF ASSIST: INDEPENDENT
DEVIATION: BRADYKINETIC
DISTANCE (FEET): 500

## 2017-03-16 NOTE — DISCHARGE PLANNING
Medical Social Work    SW attempted to meet with pt at bedside to complete DME and HHC choice forms, but pt was sleeping heavily. Choice forms not completed.    Plan: SW will attempt to complete choice forms with pt before 1600.

## 2017-03-16 NOTE — CARE PLAN
Problem: Skin Integrity  Goal: Risk for impaired skin integrity will decrease  Outcome: PROGRESSING SLOWER THAN EXPECTED  Pt. Skin is intact as far as wounds go however she is extremely bruised. The majority of her right arm, parts of her left arm, and her abdomen are very red/ purple almost completely covering those areas of skin. Heparin has been held and MD aware. Lower extremity swelling is also present, 2+ on the right foot and 3+ on the left. Elevated.     Problem: Mobility  Goal: Risk for activity intolerance will decrease  Outcome: PROGRESSING AS EXPECTED  Pt. Has been up self several times to the restroom. Physical therapy took her around the unit and pt de-sated to 70's without O2. With O2 on she is sating well and has no labored breathing at this time.

## 2017-03-16 NOTE — DISCHARGE PLANNING
Medical Social Work    SHEILA met with pt at bedside and went over the choice form for DME and HHC. Pt selected [facility] (for O2) as her providers. SHEILA faxed both choice forms to [facility] for processing.    Plan: SHEILA will follow for acceptance of pt for services with [facility]  and [facility].

## 2017-03-16 NOTE — PROGRESS NOTES
Complete linen change was done per patient request. She is currently resting comfortably in bed. Her bed is locked, belongings are nearby, and the call light is within reach.

## 2017-03-16 NOTE — PROGRESS NOTES
Pt /80; scheduled antihypertensives given. Will recheck BP for effectiveness and medication PRN as appropriate.

## 2017-03-16 NOTE — PROGRESS NOTES
Assumed care at 0700, bedside report received from NOC shift RN. Patient is AO4 with no signs of distress. Initial assessment completed, orders reviewed, call light within reach, and hourly rounding in place. POC addressed with patient, no additional questions at this time.

## 2017-03-16 NOTE — THERAPY
"Physical Therapy Evaluation completed.   Bed Mobility:  Supine to Sit: Independent  Transfers: Sit to Stand: Independent  Gait: Level Of Assist: Independent with No Equipment Needed       Plan of Care: Patient with no further skilled PT needs in the acute care setting at this time  Discharge Recommendations: Equipment: No Equipment Needed. Post-acute therapy recommended after discharged home.    See \"Rehab Therapy-Acute\" Patient Summary Report for complete documentation.   pt moving well and reprots she is at her baseline w/ function. pt feels she can go home and do all necessary mobiltiy. pt has all needed equipmnet at home and duaghter to help out as needed 24/7 for 1 month. pt's O2 sat w/o O2 at rest = 75%, O2 sat w/o O2 w/ ambulation is 72%, pt needs 6L of O2 to maintain 88-90% with stopping about every 50 ft for slow deep breathing w/ pursed lipped breathing. no further need for skilled acute PT services. Rn notified of session and mobiltiy.   "

## 2017-03-16 NOTE — FACE TO FACE
Face to Face Supporting Documentation - Home Health    The encounter with this patient was in whole or in part the primary reason for home health admission.    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 Leaving home requires a considerable and taxing effort. There is a normal inability to leave the home.    Community Physician to provide follow up care: Denver J Miller, M.D.     Optional Interventions? Check weights daily      I certify the face to face encounter for this home health care referral meets the CMS requirements and the encounter/clinical assessment with the patient was, in whole, or in part, for the medical condition(s) listed above, which is the primary reason for home health care. Based on my clinical findings: the service(s) are medically necessary, support the need for home health care, and the homebound criteria are met.  I certify that this patient has had a face to face encounter by myself.  Armando Puga M.D. - NPI: [id number]

## 2017-03-16 NOTE — PROGRESS NOTES
HOSPITALIST PROGRESS NOTE (SOAP)   Date of Service  3/15  CHIEF COMPLAINT  76 y.o.yo female presented 3/8/2017 with Shortness of Breath and Cough    SUBJECTIVE  Anxious and was tried on Xanax but did not work. Reportedly had breathing difficulty in the morning and oxygen was increased. Of note blood presures were elevated.  PHYSICAL EXAM  Filed Vitals:    03/15/17 0743 03/15/17 1200 03/15/17 1600 03/15/17 2015   BP: 195/90 197/73 176/56 173/80   Pulse: 72 72 77 72   Temp: 36.8 °C (98.2 °F) 36.7 °C (98 °F) 36.3 °C (97.3 °F) 36 °C (96.8 °F)   Resp: 22 20 18 18   Height:       Weight:    74.6 kg (164 lb 7.4 oz)   SpO2: 91% 94% 91% 94%       Intake/Output Summary (Last 24 hours) at 03/15/17 2056  Last data filed at 03/15/17 1130   Gross per 24 hour   Intake      0 ml   Output   1100 ml   Net  -1100 ml   Vitals reviewed  General/Constitutional: No acute distress.    Head: Normocephalic, atraumatic  ENT: Oral mucosa is moist. No obvious pharyngeal exudates  Eyes: Pink conjunctiva, no scleral icterus  Neck: Supple, no lymphadenopathy  Cardiovascular: Normal rate and regular rhythm. S1,2 noted. No murmurs, gallops or rubs.  Pulmonary: Occ wheezing, slightly improved but has some bibasilar crackles, cough  Abdominal: Soft, nontender, not distended, bowel sounds normoactive.  Musculoskeletal: No tenderness to palpation of chest wall.  Neurologic: Grossly nonfocal, moving all extremities.  Genitourinary: No gross hematuria  Skin: No obvious rash.  Psychiatric: Pleasant, cooperative.  LABS  Lab Results   Component Value Date/Time    WBC 12.1* 03/15/2017 02:13 AM    RBC 4.08* 03/15/2017 02:13 AM    HEMOGLOBIN 12.4 03/15/2017 02:13 AM    HEMATOCRIT 38.9 03/15/2017 02:13 AM    MCV 95.3 03/15/2017 02:13 AM    MCH 30.4 03/15/2017 02:13 AM    MCHC 31.9* 03/15/2017 02:13 AM    MPV 10.4 03/15/2017 02:13 AM    NEUTROPHILS-POLYS 71.10 03/08/2017 05:30 PM    LYMPHOCYTES 18.10* 03/08/2017 05:30 PM    MONOCYTES 8.90 03/08/2017 05:30 PM     EOSINOPHILS 1.00 03/08/2017 05:30 PM    BASOPHILS 0.50 03/08/2017 05:30 PM      Lab Results   Component Value Date/Time    SODIUM 141 03/15/2017 02:13 AM    POTASSIUM 4.5 03/15/2017 02:13 AM    CHLORIDE 109 03/15/2017 02:13 AM    CO2 26 03/15/2017 02:13 AM    GLUCOSE 124* 03/15/2017 02:13 AM    BUN 33* 03/15/2017 02:13 AM    CREATININE 1.09 03/15/2017 02:13 AM      No results found for: PROTHROMBTM, INR   BLOOD CULTURE   Date Value Ref Range Status   03/08/2017 No growth after 5 days of incubation.  Final   03/08/2017 No growth after 5 days of incubation.  Final     Labs reviewed  Imaging reviewed  ASSESSMENT and PLAN  Active Problems:    COPD exacerbation (CMS-HCC)    Acute respiratory failure with hypoxia  On inhalers, breathing treatments, PO steroids and IV antibiotics. Switch to PO antibiotics if same or better tomorrow. Will need exercise oximetry. Consider discharge tomorrow as her O2 requirement went up today. Continue current medications but add IV Lasix to help with blood pressure and possible pulmonary edema. Obtain repeat CXR.    INDIA (acute kidney injury) (CMS-HCC)    CKD (chronic kidney disease)  Cr- baseline if not better than before. Resolved.    HTN (hypertension)    Accelerated hypertension  Uncontrolled. Start low dose Lasix and trend Cr-, potassium since echo showed Grade 1 diastolic dysfunction. Because of the shortness of breath today and crackles, I have added Lasix. Add amlodipine.    Hypothyroid  Continue Synthroid    Chronic pain, neck/narcotics  Stable, not on scheduled narcotics.     Depression  On trazodone.    Polycythemia  Likely reactive from smoking history and pulmonary issues. Continue baby aspirin    Hyponatremia  UOsm not that high. Could be hypovolemia vs SIADH related to pulmonary issues. Resolved but rate of rise somewhat fast (7 in 12 hours). Stop salt tabs.    Hyperglycemia  A1c is 6.1 and only mild hyperglycemia. Dietary modification; defer to Denver J Miller, M.D.if he is a  candidate for oral hypoglycemics.    Elevated brain natriuretic peptide (BNP) level    Elevated troponin  Very mild troponin elevation. Likely poor troponin clearance from CKD, demand ischemia/NSTEMI type 2 from hypoxia. Follow up Denver J Miller, M.D.and defer elective stress test.    Hyperkalemia  Mild. WIll start Lasix for BP and diastolic dysfunction, this will help with lowering K+.    Hematoma of arm  From IV. Mild swelling. Keep arm elevated. Hb stable. Trend H/H. Can use compressive dressing if needed. Switch IV sites.    Pharmaoclogic DVT prophylaxis.    I spent 50 minutes, reviewing the chart, notes, vitals, labs, imaging, ordering labs, evaluating Kyaw Cuevas for assessment, enacting the plan above and writing this note. 50% of the time was spent in counseling Brayanrubinjosue Cuevas and answering questions. All of the above were discussed. Medical decision making is therefore complex.

## 2017-03-16 NOTE — CARE PLAN
Problem: Communication  Goal: The ability to communicate needs accurately and effectively will improve  Intervention: Use communication aids and/or /Language Line as appropriate  White board updated with POC and care team information during bedside report.      Problem: Safety  Goal: Will remain free from falls  Fall precautions in place. Bed in lowest position. Non-skid socks in place. Personal possessions within reach. Mobility sign on door. Call light within reach. Pt educated regarding fall prevention and states understanding. Ambulates with steady gait.

## 2017-03-16 NOTE — PROGRESS NOTES
Bedside report received from TRANG Cali. POC discussed with pt; all questions answered at this time. White board updated. Appropriate functioning of tele monitor confirmed. All needs met at this time.

## 2017-03-17 ENCOUNTER — PATIENT OUTREACH (OUTPATIENT)
Dept: HEALTH INFORMATION MANAGEMENT | Facility: OTHER | Age: 77
End: 2017-03-17

## 2017-03-17 VITALS
RESPIRATION RATE: 20 BRPM | HEART RATE: 66 BPM | TEMPERATURE: 98.2 F | OXYGEN SATURATION: 91 % | DIASTOLIC BLOOD PRESSURE: 71 MMHG | BODY MASS INDEX: 29.66 KG/M2 | SYSTOLIC BLOOD PRESSURE: 173 MMHG | HEIGHT: 62 IN | WEIGHT: 161.16 LBS

## 2017-03-17 LAB
ANION GAP SERPL CALC-SCNC: 2 MMOL/L (ref 0–11.9)
BUN SERPL-MCNC: 28 MG/DL (ref 8–22)
CALCIUM SERPL-MCNC: 7.8 MG/DL (ref 8.5–10.5)
CHLORIDE SERPL-SCNC: 104 MMOL/L (ref 96–112)
CO2 SERPL-SCNC: 32 MMOL/L (ref 20–33)
CREAT SERPL-MCNC: 0.85 MG/DL (ref 0.5–1.4)
ERYTHROCYTE [DISTWIDTH] IN BLOOD BY AUTOMATED COUNT: 51.2 FL (ref 35.9–50)
GFR SERPL CREATININE-BSD FRML MDRD: >60 ML/MIN/1.73 M 2
GLUCOSE SERPL-MCNC: 90 MG/DL (ref 65–99)
HCT VFR BLD AUTO: 33.2 % (ref 37–47)
HGB BLD-MCNC: 10.6 G/DL (ref 12–16)
MCH RBC QN AUTO: 30.1 PG (ref 27–33)
MCHC RBC AUTO-ENTMCNC: 31.9 G/DL (ref 33.6–35)
MCV RBC AUTO: 94.3 FL (ref 81.4–97.8)
PLATELET # BLD AUTO: 221 K/UL (ref 164–446)
PMV BLD AUTO: 11 FL (ref 9–12.9)
POTASSIUM SERPL-SCNC: 4.5 MMOL/L (ref 3.6–5.5)
RBC # BLD AUTO: 3.52 M/UL (ref 4.2–5.4)
SODIUM SERPL-SCNC: 138 MMOL/L (ref 135–145)
WBC # BLD AUTO: 10.2 K/UL (ref 4.8–10.8)

## 2017-03-17 PROCEDURE — 85027 COMPLETE CBC AUTOMATED: CPT

## 2017-03-17 PROCEDURE — A9270 NON-COVERED ITEM OR SERVICE: HCPCS | Performed by: INTERNAL MEDICINE

## 2017-03-17 PROCEDURE — 700111 HCHG RX REV CODE 636 W/ 250 OVERRIDE (IP): Performed by: INTERNAL MEDICINE

## 2017-03-17 PROCEDURE — 99239 HOSP IP/OBS DSCHRG MGMT >30: CPT | Performed by: INTERNAL MEDICINE

## 2017-03-17 PROCEDURE — 700102 HCHG RX REV CODE 250 W/ 637 OVERRIDE(OP): Performed by: INTERNAL MEDICINE

## 2017-03-17 PROCEDURE — 80048 BASIC METABOLIC PNL TOTAL CA: CPT

## 2017-03-17 PROCEDURE — 36415 COLL VENOUS BLD VENIPUNCTURE: CPT

## 2017-03-17 RX ORDER — PREDNISONE 20 MG/1
TABLET ORAL
Qty: 50 TAB | Refills: 0 | Status: SHIPPED | OUTPATIENT
Start: 2017-03-17

## 2017-03-17 RX ORDER — FUROSEMIDE 20 MG/1
20 TABLET ORAL DAILY
Qty: 60 TAB | Refills: 0 | Status: SHIPPED | OUTPATIENT
Start: 2017-03-17

## 2017-03-17 RX ORDER — BUDESONIDE AND FORMOTEROL FUMARATE DIHYDRATE 160; 4.5 UG/1; UG/1
2 AEROSOL RESPIRATORY (INHALATION) 2 TIMES DAILY
Qty: 1 INHALER | Refills: 0 | Status: SHIPPED | OUTPATIENT
Start: 2017-03-17

## 2017-03-17 RX ORDER — SIMVASTATIN 10 MG
10 TABLET ORAL EVERY EVENING
Qty: 30 TAB | Refills: 0 | Status: SHIPPED | OUTPATIENT
Start: 2017-03-17

## 2017-03-17 RX ORDER — AMLODIPINE BESYLATE 5 MG/1
5 TABLET ORAL DAILY
Qty: 30 TAB | Refills: 0 | Status: SHIPPED | OUTPATIENT
Start: 2017-03-17

## 2017-03-17 RX ADMIN — AMLODIPINE BESYLATE 5 MG: 5 TABLET ORAL at 08:23

## 2017-03-17 RX ADMIN — HYDRALAZINE HYDROCHLORIDE 50 MG: 50 TABLET ORAL at 08:23

## 2017-03-17 RX ADMIN — PREDNISONE 40 MG: 20 TABLET ORAL at 08:23

## 2017-03-17 RX ADMIN — LEVOTHYROXINE SODIUM 50 MCG: 50 TABLET ORAL at 06:29

## 2017-03-17 RX ADMIN — TIOTROPIUM BROMIDE 1 CAPSULE: 18 CAPSULE ORAL; RESPIRATORY (INHALATION) at 08:23

## 2017-03-17 RX ADMIN — OMEPRAZOLE 20 MG: 20 CAPSULE, DELAYED RELEASE ORAL at 08:23

## 2017-03-17 RX ADMIN — DILTIAZEM HYDROCHLORIDE 180 MG: 180 CAPSULE, COATED, EXTENDED RELEASE ORAL at 08:23

## 2017-03-17 RX ADMIN — FUROSEMIDE 20 MG: 20 TABLET ORAL at 08:23

## 2017-03-17 RX ADMIN — BUDESONIDE AND FORMOTEROL FUMARATE DIHYDRATE 2 PUFF: 160; 4.5 AEROSOL RESPIRATORY (INHALATION) at 08:23

## 2017-03-17 ASSESSMENT — PAIN SCALES - GENERAL
PAINLEVEL_OUTOF10: 0

## 2017-03-17 NOTE — DISCHARGE PLANNING
Received call from Shanon at [facility], they have accepted patient on service.

## 2017-03-17 NOTE — PROGRESS NOTES
HOSPITALIST PROGRESS NOTE (SOAP)   Date of Service  3/16  CHIEF COMPLAINT  76 y.o.yo female presented 3/8/2017 with Shortness of Breath and Cough    SUBJECTIVE  Deconditioned. Requiring O2 still  PHYSICAL EXAM  Filed Vitals:    03/16/17 0800 03/16/17 1105 03/16/17 1200 03/16/17 1600   BP: 167/64  148/56 135/65   Pulse: 58  85 67   Temp: 36.4 °C (97.6 °F)  36.2 °C (97.1 °F) 37.2 °C (98.9 °F)   Resp: 16 16 18   Height:       Weight:       SpO2: 95% 90% 91% 91%     No intake or output data in the 24 hours ending 03/16/17 2015Vitals reviewed  General/Constitutional: No acute distress.    Head: Normocephalic, atraumatic  ENT: Oral mucosa is moist. No obvious pharyngeal exudates  Eyes: Pink conjunctiva, no scleral icterus  Neck: Supple, no lymphadenopathy  Cardiovascular: Normal rate and regular rhythm. S1,2 noted. No murmurs, gallops or rubs.  Pulmonary: Occ wheezing, slightly improved but has some bibasilar crackles, cough  Abdominal: Soft, nontender, not distended, bowel sounds normoactive.  Musculoskeletal: No tenderness to palpation of chest wall.  Neurologic: Grossly nonfocal, moving all extremities.  Genitourinary: No gross hematuria  Skin: No obvious rash.  Psychiatric: Pleasant, cooperative.  LABS  Lab Results   Component Value Date/Time    WBC 12.5* 03/16/2017 03:06 AM    RBC 3.86* 03/16/2017 03:06 AM    HEMOGLOBIN 11.5* 03/16/2017 03:06 AM    HEMATOCRIT 36.2* 03/16/2017 03:06 AM    MCV 93.8 03/16/2017 03:06 AM    MCH 29.8 03/16/2017 03:06 AM    MCHC 31.8* 03/16/2017 03:06 AM    MPV 10.1 03/16/2017 03:06 AM    NEUTROPHILS-POLYS 71.10 03/08/2017 05:30 PM    LYMPHOCYTES 18.10* 03/08/2017 05:30 PM    MONOCYTES 8.90 03/08/2017 05:30 PM    EOSINOPHILS 1.00 03/08/2017 05:30 PM    BASOPHILS 0.50 03/08/2017 05:30 PM      Lab Results   Component Value Date/Time    SODIUM 139 03/16/2017 03:06 AM    POTASSIUM 4.4 03/16/2017 03:06 AM    CHLORIDE 106 03/16/2017 03:06 AM    CO2 31 03/16/2017 03:06 AM    GLUCOSE 83 03/16/2017  03:06 AM    BUN 30* 03/16/2017 03:06 AM    CREATININE 0.77 03/16/2017 03:06 AM      No results found for: PROTHROMBTM, INR   BLOOD CULTURE   Date Value Ref Range Status   03/08/2017 No growth after 5 days of incubation.  Final   03/08/2017 No growth after 5 days of incubation.  Final     Labs reviewed  Imaging reviewed  ASSESSMENT and PLAN  Active Problems:    COPD exacerbation (CMS-HCC)    Acute respiratory failure with hypoxia  On inhalers, breathing treatments, PO steroids and IV antibiotics. Switch to PO antibiotics if same or better tomorrow. Ordered exercise oximetry. Continue current medications but add IV Lasix to help with blood pressure and possible pulmonary edema. Obtained repeat CXR which showed no more edema. Switch to PO Lasix. Because of high O2 requirement, slightly dilated RV and normal CXR now wilget Ddimer and if positive may need CTPE.    INDIA (acute kidney injury) (CMS-HCC)    CKD (chronic kidney disease)  Cr- baseline if not better than before. Resolved.    HTN (hypertension)    Accelerated hypertension  Uncontrolled. Start low dose Lasix and trend Cr-, potassium since echo showed Grade 1 diastolic dysfunction. Because of the shortness of breath today and crackles, I have added Lasix. Add amlodipine. She has improved but still has episodes of hypertension.    Hypothyroid  Continue Synthroid    Chronic pain, neck/narcotics  Stable, not on scheduled narcotics.      Depression  On trazodone.    Polycythemia  Likely reactive from smoking history and pulmonary issues. Continue baby aspirin    Hyponatremia  UOsm not that high. Could be hypovolemia vs SIADH related to pulmonary issues. Resolved but rate of rise somewhat fast (7 in 12 hours). Stop salt tabs.    Hyperglycemia  A1c is 6.1 and only mild hyperglycemia. Dietary modification first; defer to Denver J Miller, M.D.if he is a candidate for oral hypoglycemics.    Elevated brain natriuretic peptide (BNP) level    Elevated troponin  Very mild  troponin elevation. Likely poor troponin clearance from CKD, demand ischemia/NSTEMI type 2 from hypoxia. Follow up Denver J Miller, M.D.and defer elective stress test.    Hyperkalemia  Mild. WIll start Lasix for BP and diastolic dysfunction, this will help with lowering K+.    Hematoma of arm  From IV. Mild swelling. Keep arm elevated. Hb stable. Trend H/H. Can use compressive dressing if needed. Switch IV sites.    [employer] DVT prophylaxis.    I spent 44 minutes, reviewing the chart, notes, vitals, labs, imaging, ordering labs, evaluating Kyaw Cuevas for assessment, enacting the plan above and writing this note. 50% of the time was spent in counseling Brayanrubinjosue Cuevas and answering questions. All of the above were discussed. Medical decision making is therefore complex.

## 2017-03-17 NOTE — PROGRESS NOTES
Assumed care at 0700, bedside report received from NOC shift RN. Patient is AAOX4 with no sign of distress. Initial assessment completed, orders reviewed, call light within reach, bed alarm in use, and hourly rounding in place. POC addressed with patient no additional questions at this time.

## 2017-03-17 NOTE — DISCHARGE SUMMARY
HOSPITAL MEDICINE DISCHARGE SUMMARY    Name: Kyaw Cuevas  MRN: [id number]  : 1940  Admit Date: 3/8/2017  Discharge Date: 3/17/2017  Attending Provider: Armando Puga M.D.  Primary Care Physician: Denver J Miller, M.D.    DISCHARGE DIAGNOSES, PLAN AND FOLLOW-UP AND HOSPITAL COURSE  Please review Dr. Chandler Joseph D.O. notes for further details of history of present illness, past medical/social/family histories, allergies and medications.     COPD exacerbation (CMS-HCC)    Acute respiratory failure with hypoxia  On inhalers, breathing treatments, PO steroids and IV antibiotics. Switched to PO antibiotics and she completed the course for bronchitis (GOLD IV serverity). Ordered exercise oximetry and she will go home on oxygen. Repeat CXR showed no pulmonary edema and only small pleural effusions. Therefore will keep low dose Lasix and she will go home on tapering steroids, nebulizers, inhalers. Follow up to Denver J Miller, M.D.in 1 week and since she will be on Lasix, BMP highly recommended at that visit. Potassiums always were at around 4 so for now no K supplementation.    INDIA (acute kidney injury) (CMS-HCC)    CKD (chronic kidney disease)  Cr- baseline if not better than before. Discontinued NSAIDs/ibuprofem. Resolved.    HTN (hypertension)    Accelerated hypertension  Uncontrolled. Echo showed Grade 1 diastolic dysfunction. Because of the mild pleural effusions, I have added Lasix. Added amlodipine. SBPs in the 140s on average. She will need to follow up with Denver J Miller, M.D.in 1-2 weeks and I have recommended logging her blood pressures (she has also home health) and this can be reviewed by her primary provider.    Hypothyroid  Continue Synthroid    Chronic pain, neck/narcotics  Stable, not on scheduled narcotics.      Depression  On trazodone.    Polycythemia  Likely reactive from smoking history and pulmonary issues. Continue baby aspirin    Hyponatremia  UOsm not that high. Could be  hypovolemia vs SIADH related to pulmonary issues. Resolved but rate of rise somewhat fast (7 points in 12 hours) prior to my inheriting her care. Stopped salt tabs. Resolved at discharge.    Hyperglycemia  A1c is 6.1 and only mild hyperglycemia. Dietary modification; defer to Denver J Miller, M.D.if he is a candidate for oral hypoglycemics.    Elevated brain natriuretic peptide (BNP) level    Elevated troponin  Very mild troponin elevation. Likely poor troponin clearance from CKD, demand ischemia/NSTEMI type 2 from hypoxia. Follow up Denver J Miller, M.D.and defer elective stress test.    Hyperkalemia  Mild. Now resolved averaging at 4.    Hematoma of arm  From IV infiltration. Mild swelling. Keep arm elevated. Hb stable. Can use compressive dressing if needed. Improved.    DNR  She will go home with home health care.    Please see discharge diagnoses, plan and follow up above for details of presenting problem and other medical issues    Kyaw Cuevas improved and was deemed ready to be discharged from the hospital as there were no further inpatient needs. Kyaw Cuevas felt comfortable going home with home health care. The discharge plan was discussed with Kyaw Cuevas, and agreed to it. Kyaw Cuevas was subsequently discharged in improved and stable condition.    DISCHARGE MEDICATIONS:    Kyaw Cuevas   Home Medication Instructions ZACK:[id number]    Printed on:03/17/17 7856   Medication Information                      albuterol (VENTOLIN OR PROVENTIL) 108 (90 BASE) MCG/ACT AERS  Inhale 1-2 Puffs by mouth every four hours as needed.             alprazolam (XANAX) 0.5 MG TABS  Take 1 mg by mouth 3 times a day as needed.             amlodipine (NORVASC) 5 MG Tab  Take 1 Tab by mouth every day.             aspirin EC (ECOTRIN) 81 MG TBEC  Take 81 mg by mouth every day.             budesonide-formoterol (SYMBICORT) 160-4.5 MCG/ACT Aerosol  Inhale 2 Puffs by mouth 2 Times a Day.             diltiazem  CD (CARTIA XT) 180 MG CP24  Take 180 mg by mouth 2 Times a Day.             furosemide (LASIX) 20 MG Tab  Take 1 Tab by mouth every day.             hydrALAZINE (APRESOLINE) 50 MG TABS  Take 50 mg by mouth 2 Times a Day.             hydrochlorothiazide (HYDRODIURIL) 25 MG Tab  Take 25 mg by mouth every day.             hydrocodone/acetaminophen (NORCO)  MG Tab  Take 1 Tab by mouth every 6 hours as needed.                          ipratropium-albuterol (DUONEB) 0.5-2.5 (3) MG/3ML nebulizer solution  3 mL by Nebulization route 4 times a day.             irbesartan (AVAPRO) 300 MG TABS  Take 300 mg by mouth every day.             levothyroxine (SYNTHROID) 50 MCG TABS  Take 50 mcg by mouth every day.             montelukast (SINGULAIR) 10 MG TABS  Take 10 mg by mouth every evening.             niacin 500 MG Tab  Take 500 mg by mouth 2 times a day.             omeprazole (PRILOSEC) 20 MG delayed-release capsule  Take 20 mg by mouth 2 times a day.             predniSONE (DELTASONE) 20 MG Tab  40mg PO daily x 5 days then 30mg PO daily x 5 days then 40mg PO daily x 5 days then 20mg PO daily x 5 days then 10mg PO daily x 5 days then 5mg PO daily x 5 days then STOP             sertraline (ZOLOFT) 100 MG TABS  Take 100 mg by mouth every day.             simvastatin (ZOCOR) 10 MG Tab  Take 1 Tab by mouth every evening.             temazepam (RESTORIL) 15 MG Cap  Take 15-30 mg by mouth at bedtime as needed for Sleep.             tiotropium (SPIRIVA) 18 MCG CAPS  Inhale 18 mcg by mouth every day.             trazodone (DESYREL) 50 MG TABS  Take 50 mg by mouth every evening.                 DISCHARGE VITALS, LABS and IMAGING  Filed Vitals:    03/16/17 2000 03/17/17 0000 03/17/17 0400 03/17/17 0809   BP: 153/50 158/59 146/57 173/71   Pulse: 58 64 58 66   Temp: 36.9 °C (98.5 °F) 37.7 °C (99.9 °F) 36 °C (96.8 °F) 36.8 °C (98.2 °F)   Resp: 17 18 17 20   Height:       Weight: 73.1 kg (161 lb 2.5 oz)      SpO2: 93% 96% 95% 91%      Lab Results   Component Value Date/Time    WBC 10.2 03/17/2017 03:37 AM    RBC 3.52* 03/17/2017 03:37 AM    HEMOGLOBIN 10.6* 03/17/2017 03:37 AM    HEMATOCRIT 33.2* 03/17/2017 03:37 AM    MCV 94.3 03/17/2017 03:37 AM    MCH 30.1 03/17/2017 03:37 AM    MCHC 31.9* 03/17/2017 03:37 AM    MPV 11.0 03/17/2017 03:37 AM    NEUTROPHILS-POLYS 71.10 03/08/2017 05:30 PM    LYMPHOCYTES 18.10* 03/08/2017 05:30 PM    MONOCYTES 8.90 03/08/2017 05:30 PM    EOSINOPHILS 1.00 03/08/2017 05:30 PM    BASOPHILS 0.50 03/08/2017 05:30 PM      Lab Results   Component Value Date/Time    SODIUM 138 03/17/2017 03:37 AM    POTASSIUM 4.5 03/17/2017 03:37 AM    CHLORIDE 104 03/17/2017 03:37 AM    CO2 32 03/17/2017 03:37 AM    GLUCOSE 90 03/17/2017 03:37 AM    BUN 28* 03/17/2017 03:37 AM    CREATININE 0.85 03/17/2017 03:37 AM      No results found for: PROTHROMBTM, INR     Dx-chest-portable (1 View)    3/16/2017  3/16/2017 6:06 AM HISTORY/REASON FOR EXAM:  Shortness of Breath. TECHNIQUE/EXAM DESCRIPTION AND NUMBER OF VIEWS: Single portable view of the chest. COMPARISON: One view chest dated 3/8/2017 FINDINGS: The lungs show linear density that is consistent with atelectasis. The cardiac silhouette is enlarged. There small bilateral pleural effusion. There are no pneumothoraces. No significant bony abnormality is present.     3/16/2017  1.  No significant change 2.  Enlarged cardiac silhouette 3.  Small bilateral pleural effusion and mild atelectasis    Dx-chest-portable (1 View)    3/8/2017  3/8/2017 6:48 PM HISTORY/REASON FOR EXAM:  Cough. TECHNIQUE/EXAM DESCRIPTION AND NUMBER OF VIEWS: Single portable view of the chest. COMPARISON: None FINDINGS: The heart is enlarged. Thoracic aorta is tortuous with calcification. No pulmonary infiltrates or consolidations are noted. Both costophrenic angles are blunted. Streaky parenchymal opacities are present in the bases which may represent infiltrates or atelectasis.     3/8/2017  Cardiomegaly.  Bibasilar opacities which may represent subtle infiltrates or atelectasis.    Echocardiogram Comp W/o Cont    3/9/2017  Transthoracic Echo Report Echocardiography Laboratory CONCLUSIONS Hyperdynamic left ventricular systolic function. Left ventricular ejection fraction is visually estimated to be greater than 70%. Mild concentric left ventricular hypertrophy. Grade I diastolic dysfunction. Enlarged right atrium. Mildly dilated right ventricle. Trace mitral regurgitation. Mild aortic stenosis. Transvalvular gradients are - Peak: 22 mmHg, Mean: 10 mmHg. This may be, in part,artifactual due to hyperdynamic LV contractility. Right ventricular systolic pressure is estimated to be 65 mmHg. Moderate tricuspid regurgitation. ANIYA LOZANO Exam Date:         2017                    09:12 Exam Location:     Inpatient Priority:          Routine Ordering Physician:        TIA BAEZ Referring Physician:       NESTOR Aguirre Sonographer:               Tamar Yuen RDCS, RVT Age:    76     Gender:    F MRN:    [id number] :    1940 BSA:    1.72   Ht (in):    62     Wt (lb):    156 Exam Type:     Complete Indications:     Dyspnea ICD Codes:       786.09 CPT Codes:       58370 BP:   150    /   62     HR: Technical Quality:       Technically difficult study                          adequate information is obtained MEASUREMENTS  (Male / Female) Normal Values 2D ECHO LV Diastolic Diameter PLAX        4 cm                  4.2 - 5.9 / 3.9 - 5.3 cm LV Systolic Diameter PLAX         2.6 cm                2.1 - 4.0 cm IVS Diastolic Thickness           1.2 cm                LVPW Diastolic Thickness          1.2 cm                LVOT Diameter                     1.7 cm                Estimated LV Ejection Fraction    70 %                  LV Ejection Fraction MOD BP       74.8 %                >= 55  % LV Ejection Fraction MOD 4C       76.3 %                LV Ejection Fraction MOD 2C        75.8 %                IVC Diameter                      2.4 cm                M-MODE Aortic Root Diameter MM           2.9 cm                DOPPLER AV Peak Velocity                  2.1 m/s               AV Peak Gradient                  18.1 mmHg             AV Mean Gradient                  8.8 mmHg              LVOT Peak Velocity                1.4 m/s               AV Area Cont Eq vti               1.7 cm²               Mitral E Point Velocity           0.9 m/s               Mitral E to A Ratio               0.97                  MV Pressure Half Time             58.3 ms               MV Area PHT                       3.8 cm²               MV Deceleration Time              201 ms                TR Peak Velocity                  286 cm/s              PV Peak Velocity                  1.3 m/s               PV Peak Gradient                  6.4 mmHg              RVOT Peak Velocity                1 m/s                 * Indicates values subject to auto-interpretation LV EF:  70    % FINDINGS Left Ventricle Normal left ventricular chamber size. Mild concentric left ventricular hypertrophy. Hyperdynamic left ventricular systolic function. Left ventricular ejection fraction is visually estimated to be greater than 70%. Grade I diastolic dysfunction. Right Ventricle Mildly dilated right ventricle. Normal right ventricular systolic function. Right Atrium Enlarged right atrium. Left Atrium Mildly dilated left atrium. Left atrial volume index is 35   mL/sq m. Mitral Valve Mitral annular calcification. Trace mitral regurgitation. Aortic Valve Calcified aortic valve leaflets. Mild aortic stenosis. Transvalvular gradients are - Peak: 22 mmHg, Mean: 10 mmHg. Aortic valve area calculated from the continuity equation is 1.4-1.8 cm2. Tricuspid Valve Structurally normal tricuspid valve. Moderate tricuspid regurgitation. Right ventricular systolic pressure is estimated to be 65 mmHg. Pulmonic Valve The pulmonic valve is not well  visualized. No pulmonic insufficiency. Pericardium Trivial pericardial effusion. Aorta Tacos Ferrer M.D. (Electronically Signed) Final Date:     09 March 2017                 15:00      Please see discharge diagnoses, plan and follow up above for details of pending tests and postdischarge instructions for the clinic providers and specialists.    Please CC Denver J Miller, M.D.    For further details on discharge medications, patient education, diet, and activity, please refer to electronic copy of discharge instructions.       TIME SPENT: 45 minutes, with greater than 50% of the time spent on face-to-face encounter, addressing medical issues, coordination of care, counseling, discharge planning, medication reconciliation, and documentation.

## 2017-03-17 NOTE — DISCHARGE INSTRUCTIONS
Discharge Instructions    Discharged to home by car with relative. Discharged via wheelchair, hospital escort: Yes.  Special equipment needed: Oxygen    Be sure to schedule a follow-up appointment with your primary care doctor or any specialists as instructed.     Discharge Plan:   Diet Plan: Discussed  Activity Level: Discussed  Confirmed Follow up Appointment: Patient to Call and Schedule Appointment  Confirmed Symptoms Management: Discussed  Medication Reconciliation Updated: Yes  Pneumococcal Vaccine Given - only chart on this line when given: Given (See MAR)  Influenza Vaccine Indication: Not indicated: Previously immunized this influenza season and > 8 years of age    I understand that a diet low in cholesterol, fat, and sodium is recommended for good health. Unless I have been given specific instructions below for another diet, I accept this instruction as my diet prescription.   Other diet: as tolerated    Special Instructions: None    · Is patient discharged on Warfarin / Coumadin?   No     · Is patient Post Blood Transfusion?  No    Depression / Suicide Risk    As you are discharged from this [facility] facility, it is important to learn how to keep safe from harming yourself.    Recognize the warning signs:  · Abrupt changes in personality, positive or negative- including increase in energy   · Giving away possessions  · Change in eating patterns- significant weight changes-  positive or negative  · Change in sleeping patterns- unable to sleep or sleeping all the time   · Unwillingness or inability to communicate  · Depression  · Unusual sadness, discouragement and loneliness  · Talk of wanting to die  · Neglect of personal appearance   · Rebelliousness- reckless behavior  · Withdrawal from people/activities they love  · Confusion- inability to concentrate     If you or a loved one observes any of these behaviors or has concerns about self-harm, here's what you can do:  · Talk about it- your feelings  and reasons for harming yourself  · Remove any means that you might use to hurt yourself (examples: pills, rope, extension cords, firearm)  · Get professional help from the community (Mental Health, Substance Abuse, psychological counseling)  · Do not be alone:Call your Safe Contact- someone whom you trust who will be there for you.  · Call your local CRISIS HOTLINE 115-1492 or 854-151-3063  · Call your local Children's Mobile Crisis Response Team [place] (236) 869-3613 or wwwDNA Guide  · Call the toll free National Suicide Prevention Hotlines   · National Suicide Prevention Lifeline 737-174-HDTR (7653)  · National Review Trackers Line Network 800-SUICIDE (738-8046)        Chronic Obstructive Pulmonary Disease  Chronic obstructive pulmonary disease (COPD) is a common lung condition in which airflow from the lungs is limited. COPD is a general term that can be used to describe many different lung problems that limit airflow, including both chronic bronchitis and emphysema. If you have COPD, your lung function will probably never return to normal, but there are measures you can take to improve lung function and make yourself feel better.  CAUSES   · Smoking (common).  · Exposure to secondhand smoke.  · Genetic problems.  · Chronic inflammatory lung diseases or recurrent infections.  SYMPTOMS  2. Shortness of breath, especially with physical activity.  3. Deep, persistent (chronic) cough with a large amount of thick mucus.  4. Wheezing.  5. Rapid breaths (tachypnea).  6. Gray or bluish discoloration (cyanosis) of the skin, especially in your fingers, toes, or lips.  7. Fatigue.  8. Weight loss.  9. Frequent infections or episodes when breathing symptoms become much worse (exacerbations).  10. Chest tightness.  DIAGNOSIS  Your health care provider will take a medical history and perform a physical examination to diagnose COPD. Additional tests for COPD may include:  2. Lung (pulmonary) function tests.  3. Chest  X-ray.  4. CT scan.  5. Blood tests.  TREATMENT   Treatment for COPD may include:  2. Inhaler and nebulizer medicines. These help manage the symptoms of COPD and make your breathing more comfortable.  3. Supplemental oxygen. Supplemental oxygen is only helpful if you have a low oxygen level in your blood.  4. Exercise and physical activity. These are beneficial for nearly all people with COPD.  5. Lung surgery or transplant.  6. Nutrition therapy to gain weight, if you are underweight.  7. Pulmonary rehabilitation. This may involve working with a team of health care providers and specialists, such as respiratory, occupational, and physical therapists.  HOME CARE INSTRUCTIONS  2. Take all medicines (inhaled or pills) as directed by your health care provider.  3. Avoid over-the-counter medicines or cough syrups that dry up your airway (such as antihistamines) and slow down the elimination of secretions unless instructed otherwise by your health care provider.  4. If you are a smoker, the most important thing that you can do is stop smoking. Continuing to smoke will cause further lung damage and breathing trouble. Ask your health care provider for help with quitting smoking. He or she can direct you to community resources or hospitals that provide support.  5. Avoid exposure to irritants such as smoke, chemicals, and fumes that aggravate your breathing.  6. Use oxygen therapy and pulmonary rehabilitation if directed by your health care provider. If you require home oxygen therapy, ask your health care provider whether you should purchase a pulse oximeter to measure your oxygen level at home.  7. Avoid contact with individuals who have a contagious illness.  8. Avoid extreme temperature and humidity changes.  9. Eat healthy foods. Eating smaller, more frequent meals and resting before meals may help you maintain your strength.  10. Stay active, but balance activity with periods of rest. Exercise and physical activity  will help you maintain your ability to do things you want to do.  11. Preventing infection and hospitalization is very important when you have COPD. Make sure to receive all the vaccines your health care provider recommends, especially the pneumococcal and influenza vaccines. Ask your health care provider whether you need a pneumonia vaccine.  12. Learn and use relaxation techniques to manage stress.  13. Learn and use controlled breathing techniques as directed by your health care provider. Controlled breathing techniques include:  1. Pursed lip breathing. Start by breathing in (inhaling) through your nose for 1 second. Then, purse your lips as if you were going to whistle and breathe out (exhale) through the pursed lips for 2 seconds.  2. Diaphragmatic breathing. Start by putting one hand on your abdomen just above your waist. Inhale slowly through your nose. The hand on your abdomen should move out. Then purse your lips and exhale slowly. You should be able to feel the hand on your abdomen moving in as you exhale.  14. Learn and use controlled coughing to clear mucus from your lungs. Controlled coughing is a series of short, progressive coughs. The steps of controlled coughing are:  1. Lean your head slightly forward.  2. Breathe in deeply using diaphragmatic breathing.  3. Try to hold your breath for 3 seconds.  4. Keep your mouth slightly open while coughing twice.  5. Spit any mucus out into a tissue.  6. Rest and repeat the steps once or twice as needed.  SEEK MEDICAL CARE IF:  · You are coughing up more mucus than usual.  · There is a change in the color or thickness of your mucus.  · Your breathing is more labored than usual.  · Your breathing is faster than usual.  SEEK IMMEDIATE MEDICAL CARE IF:  · You have shortness of breath while you are resting.  · You have shortness of breath that prevents you from:  ¨ Being able to talk.  ¨ Performing your usual physical activities.  · You have chest pain lasting  longer than 5 minutes.  · Your skin color is more cyanotic than usual.  · You measure low oxygen saturations for longer than 5 minutes with a pulse oximeter.  MAKE SURE YOU:  · Understand these instructions.  · Will watch your condition.  · Will get help right away if you are not doing well or get worse.     This information is not intended to replace advice given to you by your health care provider. Make sure you discuss any questions you have with your health care provider.     Document Released: 09/27/2006 Document Revised: 01/08/2016 Document Reviewed: 08/14/2014  [employer] Interactive Patient Education ©2016 [employer]    Amlodipine tablets  What is this medicine?  AMLODIPINE (am ALEXEI di peen) is a calcium-channel blocker. It affects the amount of calcium found in your heart and muscle cells. This relaxes your blood vessels, which can reduce the amount of work the heart has to do. This medicine is used to lower high blood pressure. It is also used to prevent chest pain.  This medicine may be used for other purposes; ask your health care provider or pharmacist if you have questions.  COMMON BRAND NAME(S): Norvasc  What should I tell my health care provider before I take this medicine?  They need to know if you have any of these conditions:  -heart problems like heart failure or aortic stenosis  -liver disease  -an unusual or allergic reaction to amlodipine, other medicines, foods, dyes, or preservatives  -pregnant or trying to get pregnant  -breast-feeding  How should I use this medicine?  Take this medicine by mouth with a glass of water. Follow the directions on the prescription label. Take your medicine at regular intervals. Do not take more medicine than directed.  Talk to your pediatrician regarding the use of this medicine in children. Special care may be needed. This medicine has been used in children as young as 6.  Persons over 65 years old may have a stronger reaction to this medicine and need smaller  doses.  Overdosage: If you think you have taken too much of this medicine contact a poison control center or emergency room at once.  NOTE: This medicine is only for you. Do not share this medicine with others.  What if I miss a dose?  If you miss a dose, take it as soon as you can. If it is almost time for your next dose, take only that dose. Do not take double or extra doses.  What may interact with this medicine?  -herbal or dietary supplements  -local or general anesthetics  -medicines for high blood pressure  -medicines for prostate problems  -rifampin  This list may not describe all possible interactions. Give your health care provider a list of all the medicines, herbs, non-prescription drugs, or dietary supplements you use. Also tell them if you smoke, drink alcohol, or use illegal drugs. Some items may interact with your medicine.  What should I watch for while using this medicine?  Visit your doctor or health care professional for regular check ups. Check your blood pressure and pulse rate regularly. Ask your health care professional what your blood pressure and pulse rate should be, and when you should contact him or her.  This medicine may make you feel confused, dizzy or lightheaded. Do not drive, use machinery, or do anything that needs mental alertness until you know how this medicine affects you. To reduce the risk of dizzy or fainting spells, do not sit or stand up quickly, especially if you are an older patient. Avoid alcoholic drinks; they can make you more dizzy.  Do not suddenly stop taking amlodipine. Ask your doctor or health care professional how you can gradually reduce the dose.  What side effects may I notice from receiving this medicine?  Side effects that you should report to your doctor or health care professional as soon as possible:  -allergic reactions like skin rash, itching or hives, swelling of the face, lips, or tongue  -breathing problems  -changes in vision or hearing  -chest  pain  -fast, irregular heartbeat  -swelling of legs or ankles  Side effects that usually do not require medical attention (report to your doctor or health care professional if they continue or are bothersome):  -dry mouth  -facial flushing  -nausea, vomiting  -stomach gas, pain  -tired, weak  -trouble sleeping  This list may not describe all possible side effects. Call your doctor for medical advice about side effects. You may report side effects to FDA at 0-019-FDA-9758.  Where should I keep my medicine?  Keep out of the reach of children.  Store at room temperature between 59 and 86 degrees F (15 and 30 degrees C). Protect from light. Keep container tightly closed. Throw away any unused medicine after the expiration date.  NOTE: This sheet is a summary. It may not cover all possible information. If you have questions about this medicine, talk to your doctor, pharmacist, or health care provider.  © 2014, [employer]. (11/15/2013 11:40:58 AM)    Furosemide tablets  What is this medicine?  FUROSEMIDE (fyoor [place] se mide) is a diuretic. It helps you make more urine and to lose salt and excess water from your body. This medicine is used to treat high blood pressure, and edema or swelling from heart, kidney, or liver disease.  This medicine may be used for other purposes; ask your health care provider or pharmacist if you have questions.  COMMON BRAND NAME(S): Amrik  Timrosi  What should I tell my health care provider before I take this medicine?  They need to know if you have any of these conditions:  -abnormal blood electrolytes  -diarrhea or vomiting  -gout  -heart disease  -kidney disease, small amounts of urine, or difficulty passing urine  -liver disease  -an unusual or allergic reaction to furosemide, sulfa drugs, other medicines, foods, dyes, or preservatives  -pregnant or trying to get pregnant  -breast-feeding  How should I use this medicine?  Take this medicine by mouth with a glass of water. Follow  the directions on the prescription label. You may take this medicine with or without food. If it upsets your stomach, take it with food or milk. Do not take your medicine more often than directed. Remember that you will need to pass more urine after taking this medicine. Do not take your medicine at a time of day that will cause you problems. Do not take at bedtime.  Talk to your pediatrician regarding the use of this medicine in children. While this drug may be prescribed for selected conditions, precautions do apply.  Overdosage: If you think you have taken too much of this medicine contact a poison control center or emergency room at once.  NOTE: This medicine is only for you. Do not share this medicine with others.  What if I miss a dose?  If you miss a dose, take it as soon as you can. If it is almost time for your next dose, take only that dose. Do not take double or extra doses.  What may interact with this medicine?  -aspirin and aspirin-like medicines  -certain antibiotics  -chloral hydrate  -cisplatin  -cyclosporine  -digoxin  -diuretics  -laxatives  -lithium  -medicines for blood pressure  -medicines that relax muscles for surgery  -methotrexate  -NSAIDs, medicines for pain and inflammation like ibuprofen, naproxen, or indomethacin  -phenytoin  -steroid medicines like prednisone or cortisone  -sucralfate  This list may not describe all possible interactions. Give your health care provider a list of all the medicines, herbs, non-prescription drugs, or dietary supplements you use. Also tell them if you smoke, drink alcohol, or use illegal drugs. Some items may interact with your medicine.  What should I watch for while using this medicine?  Visit your doctor or health care professional for regular checks on your progress. Check your blood pressure regularly. Ask your doctor or health care professional what your blood pressure should be, and when you should contact him or her. If you are a diabetic, check  your blood sugar as directed.  You may need to be on a special diet while taking this medicine. Check with your doctor. Also, ask how many glasses of fluid you need to drink a day. You must not get dehydrated.  You may get drowsy or dizzy. Do not drive, use machinery, or do anything that needs mental alertness until you know how this drug affects you. Do not stand or sit up quickly, especially if you are an older patient. This reduces the risk of dizzy or fainting spells. Alcohol can make you more drowsy and dizzy. Avoid alcoholic drinks.  This medicine can make you more sensitive to the sun. Keep out of the sun. If you cannot avoid being in the sun, wear protective clothing and use sunscreen. Do not use sun lamps or tanning beds/booths.  What side effects may I notice from receiving this medicine?  Side effects that you should report to your doctor or health care professional as soon as possible:  -blood in urine or stools  -dry mouth  -fever or chills  -hearing loss or ringing in the ears  -irregular heartbeat  -muscle pain or weakness, cramps  -skin rash  -stomach upset, pain, or nausea  -tingling or numbness in the hands or feet  -unusually weak or tired  -vomiting or diarrhea  -yellowing of the eyes or skin  Side effects that usually do not require medical attention (report to your doctor or health care professional if they continue or are bothersome):  -headache  -loss of appetite  -unusual bleeding or bruising  This list may not describe all possible side effects. Call your doctor for medical advice about side effects. You may report side effects to FDA at 5-519-FDA-5311.  Where should I keep my medicine?  Keep out of the reach of children.  Store at room temperature between 15 and 30 degrees C (59 and 86 degrees F). Protect from light. Throw away any unused medicine after the expiration date.  NOTE: This sheet is a summary. It may not cover all possible information. If you have questions about this medicine,  talk to your doctor, pharmacist, or health care provider.  © 2014, [employer]. (12/6/2010 4:24:50 PM)    Prednisone tablets  What is this medicine?  PREDNISONE (PRED ni sone) is a corticosteroid. It is commonly used to treat inflammation of the skin, joints, lungs, and other organs. Common conditions treated include asthma, allergies, and arthritis. It is also used for other conditions, such as blood disorders and diseases of the adrenal glands.  This medicine may be used for other purposes; ask your health care provider or pharmacist if you have questions.  COMMON BRAND NAME(S): Deltasone, Predone, Sterapred DS, Sterapred  What should I tell my health care provider before I take this medicine?  They need to know if you have any of these conditions:  -Cushing's syndrome  -diabetes  -glaucoma  -heart disease  -high blood pressure  -infection (especially a virus infection such as chickenpox, cold sores, or herpes)  -kidney disease  -liver disease  -mental illness  -myasthenia gravis  -osteoporosis  -seizures  -stomach or intestine problems  -thyroid disease  -an unusual or allergic reaction to lactose, prednisone, other medicines, foods, dyes, or preservatives  -pregnant or trying to get pregnant  -breast-feeding  How should I use this medicine?  Take this medicine by mouth with a glass of water. Follow the directions on the prescription label. Take this medicine with food. If you are taking this medicine once a day, take it in the morning. Do not take more medicine than you are told to take. Do not suddenly stop taking your medicine because you may develop a severe reaction. Your doctor will tell you how much medicine to take. If your doctor wants you to stop the medicine, the dose may be slowly lowered over time to avoid any side effects.  Talk to your pediatrician regarding the use of this medicine in children. Special care may be needed.  Overdosage: If you think you have taken too much of this  medicine contact a poison control center or emergency room at once.  NOTE: This medicine is only for you. Do not share this medicine with others.  What if I miss a dose?  If you miss a dose, take it as soon as you can. If it is almost time for your next dose, talk to your doctor or health care professional. You may need to miss a dose or take an extra dose. Do not take double or extra doses without advice.  What may interact with this medicine?  Do not take this medicine with any of the following medications:  -metyrapone  -mifepristone  This medicine may also interact with the following medications:  -aminoglutethimide  -amphotericin B  -aspirin and aspirin-like medicines  -barbiturates  -certain medicines for diabetes, like glipizide or glyburide  -cholestyramine  -cholinesterase inhibitors  -cyclosporine  -digoxin  -diuretics  -ephedrine  -female hormones, like estrogens and birth control pills  -isoniazid  -ketoconazole  -NSAIDS, medicines for pain and inflammation, like ibuprofen or naproxen  -phenytoin  -rifampin  -toxoids  -vaccines  -warfarin  This list may not describe all possible interactions. Give your health care provider a list of all the medicines, herbs, non-prescription drugs, or dietary supplements you use. Also tell them if you smoke, drink alcohol, or use illegal drugs. Some items may interact with your medicine.  What should I watch for while using this medicine?  Visit your doctor or health care professional for regular checks on your progress. If you are taking this medicine over a prolonged period, carry an identification card with your name and address, the type and dose of your medicine, and your doctor's name and address.  This medicine may increase your risk of getting an infection. Tell your doctor or health care professional if you are around anyone with measles or chickenpox, or if you develop sores or blisters that do not heal properly.  If you are going to have surgery, tell your  doctor or health care professional that you have taken this medicine within the last twelve months.  Ask your doctor or health care professional about your diet. You may need to lower the amount of salt you eat.  This medicine may affect blood sugar levels. If you have diabetes, check with your doctor or health care professional before you change your diet or the dose of your diabetic medicine.  What side effects may I notice from receiving this medicine?  Side effects that you should report to your doctor or health care professional as soon as possible:  -allergic reactions like skin rash, itching or hives, swelling of the face, lips, or tongue  -changes in emotions or moods  -changes in vision  -depressed mood  -eye pain  -fever or chills, cough, sore throat, pain or difficulty passing urine  -increased thirst  -swelling of ankles, feet  Side effects that usually do not require medical attention (report to your doctor or health care professional if they continue or are bothersome):  -confusion, excitement, restlessness  -headache  -nausea, vomiting  -skin problems, acne, thin and shiny skin  -trouble sleeping  -weight gain  This list may not describe all possible side effects. Call your doctor for medical advice about side effects. You may report side effects to FDA at 4-306-FDA-1992.  Where should I keep my medicine?  Keep out of the reach of children.  Store at room temperature between 15 and 30 degrees C (59 and 86 degrees F). Protect from light. Keep container tightly closed. Throw away any unused medicine after the expiration date.  NOTE: This sheet is a summary. It may not cover all possible information. If you have questions about this medicine, talk to your doctor, pharmacist, or health care provider.  © 2014, [employer]. (8/2/2012 10:57:14 AM)

## 2017-03-17 NOTE — FACE TO FACE
Face to Face Note  -  Durable Medical Equipment    Armando Puga M.D. - NPI: [id number]  I certify that this patient is under my care and that they had a durable medical equipment(DME)face to face encounter by myself that meets the physician DME face-to-face encounter requirements with this patient on:    Date of encounter:   Patient:                    MRN:                       YOB: 2018  Kyaw Cuevas  [id number]  1940     The encounter with the patient was in whole, or in part, for the following medical condition, which is the primary reason for durable medical equipment:  COPD    I certify that, based on my findings, the following durable medical equipment is medically necessary:  Oxygen.    HOME O2 Saturation Measurements:(Values must be present for Home Oxygen orders)  Room air sat at rest: 78  Room air sat with amb: 72  With liters of O2: 4, O2 sat at rest with O2: 92  With Liters of O2: 6, O2 sat with amb with O2 : 92  Is the patient mobile?: Yes    My Clinical findings support the need for the above equipment due to:  Hypoxia    Supporting Symptoms: hypoxia

## 2017-03-17 NOTE — CARE PLAN
Problem: Safety  Goal: Will remain free from injury  Outcome: PROGRESSING AS EXPECTED  Patient's bed is in low position, treaded socks are on, bed alarm is on, upper bed rails are up, and call light is within reach    Problem: Respiratory:  Goal: Respiratory status will improve  Outcome: PROGRESSING AS EXPECTED  Oxygen therapy is in use, HOB is at or greater than 30 deg., and patient has demonstrated the ability to cough and take deep breaths appropriately.

## 2017-03-17 NOTE — DISCHARGE PLANNING
Received choice form from BETHANIE Frye at 1136,  Referral sent to [facility] at 1225 on 03/17/17.  Spoke with Destinee from [facility] they do not accept insurance.  Referral sent to [facility] as they accept [employer].  [facility] Melva advised.

## 2017-03-17 NOTE — DISCHARGE PLANNING
Per BETHANIE Frye, spoke with Lennie at [facility], they still awaiting supervisor approval.  BETHANIE Frye advised.

## 2017-03-17 NOTE — DISCHARGE PLANNING
Spoke with Marcela at [facility] they have accepted patient and their  is en- route.

## 2017-03-17 NOTE — DISCHARGE PLANNING
Medical Social Work    SW spoke with assigned RN who indicated the O2 tank in pt's room is [facility] provided for pt during her stay, but it was not delivered by [facility] as was reported by pt. SHEILA contacted [facility] who stated the DME choice was not received. SW re-faxed pt's DME choice form, which indicates a desire to receive services with [facility].    Plan: SW will follow for any additional needs of pt.

## 2017-03-18 ENCOUNTER — PATIENT OUTREACH (OUTPATIENT)
Dept: HEALTH INFORMATION MANAGEMENT | Facility: OTHER | Age: 77
End: 2017-03-18

## 2017-03-18 NOTE — PROGRESS NOTES
Discharge orders received, summary reviewed, meds reviewed, monitor removed, IV discontinued, patient to leave by wheelchair  with family and hospital escort, patient with provided oxygen, no additional needs at this time.

## 2017-03-18 NOTE — PROGRESS NOTES
03/18/2017 1205 - Discharge Outreach attempt - LM  03/22/2017 1611 - Discharge Outreach attempt - Wrong number(home number)  03/22/2017 1614 - Discharge Outreach attempt -  on cell

## 2017-04-26 NOTE — DOCUMENTATION QUERY
DOCUMENTATION QUERY    PROVIDERS: Please select “Cosign w/ note”to reply to query.    To better represent the severity of illness of your patient, please review the following information and exercise your independent professional judgment in responding to this query.     It is documented in this chart this patient had a hematoma of the arm due to IV infiltration. Was this a complication or is it something expected of this procedure?    -Complication of IV  -Expected outcome of IV  -Other explanation of clinical findings (please document)  -Unable to determine     The medical record reflects the following:   Clinical Findings  hematoma of arm from IV infiltration   Treatment  Elevation, trend/H, compressive dressing, change IV site   Risk Factors    Location within medical record  3/14 Progress notes and discharge summary     Thank you,   Kristina Peck

## 2017-04-26 NOTE — ADDENDUM NOTE
Encounter addended by: Kristina Peck on: 4/26/2017  3:42 PM<BR>     Documentation filed: Clinical Notes

## 2017-04-27 NOTE — ADDENDUM NOTE
Encounter addended by: Kristina Peck on: 4/27/2017  8:19 AM<BR>     Documentation filed: Clinical Notes